# Patient Record
Sex: FEMALE | Race: WHITE | NOT HISPANIC OR LATINO | Employment: FULL TIME | URBAN - METROPOLITAN AREA
[De-identification: names, ages, dates, MRNs, and addresses within clinical notes are randomized per-mention and may not be internally consistent; named-entity substitution may affect disease eponyms.]

---

## 2023-05-20 DIAGNOSIS — K21.9 GASTROESOPHAGEAL REFLUX DISEASE WITHOUT ESOPHAGITIS: Primary | ICD-10-CM

## 2023-05-22 PROBLEM — R74.8 ELEVATED LIVER ENZYMES: Status: ACTIVE | Noted: 2023-05-22

## 2023-05-22 PROBLEM — I10 BENIGN HYPERTENSION: Status: ACTIVE | Noted: 2023-05-22

## 2023-05-22 PROBLEM — F32.A ANXIETY AND DEPRESSION: Status: ACTIVE | Noted: 2023-05-22

## 2023-05-22 PROBLEM — E78.2 COMBINED HYPERLIPIDEMIA: Status: ACTIVE | Noted: 2023-05-22

## 2023-05-22 PROBLEM — R14.0 BLOATING: Status: RESOLVED | Noted: 2023-05-22 | Resolved: 2023-05-22

## 2023-05-22 PROBLEM — F07.81 POST CONCUSSIVE SYNDROME: Status: ACTIVE | Noted: 2023-05-22

## 2023-05-22 PROBLEM — R41.3 MEMORY LOSS: Status: ACTIVE | Noted: 2023-05-22

## 2023-05-22 PROBLEM — G47.9 SLEEP DISTURBANCE: Status: ACTIVE | Noted: 2023-05-22

## 2023-05-22 PROBLEM — F41.9 ANXIETY AND DEPRESSION: Status: ACTIVE | Noted: 2023-05-22

## 2023-05-22 PROBLEM — K58.9 IBS (IRRITABLE BOWEL SYNDROME): Status: ACTIVE | Noted: 2023-05-22

## 2023-05-22 PROBLEM — D06.9 CIN III (CERVICAL INTRAEPITHELIAL NEOPLASIA GRADE III) WITH SEVERE DYSPLASIA: Status: ACTIVE | Noted: 2023-05-22

## 2023-05-22 PROBLEM — N84.1 CERVICAL POLYP: Status: ACTIVE | Noted: 2023-05-22

## 2023-05-22 PROBLEM — L71.9 ROSACEA: Status: ACTIVE | Noted: 2023-05-22

## 2023-05-22 PROBLEM — E66.01 MORBID OBESITY (MULTI): Status: ACTIVE | Noted: 2023-05-22

## 2023-05-22 PROBLEM — S06.0XAA CLOSED HEAD INJURY WITH CONCUSSION: Status: ACTIVE | Noted: 2023-05-22

## 2023-05-22 PROBLEM — R05.9 COUGH: Status: RESOLVED | Noted: 2023-05-22 | Resolved: 2023-05-22

## 2023-05-22 PROBLEM — K21.9 GERD (GASTROESOPHAGEAL REFLUX DISEASE): Status: ACTIVE | Noted: 2023-05-22

## 2023-05-22 RX ORDER — TRIAMTERENE AND HYDROCHLOROTHIAZIDE 37.5; 25 MG/1; MG/1
1 CAPSULE ORAL DAILY
COMMUNITY
End: 2023-06-12

## 2023-05-22 RX ORDER — DESVENLAFAXINE SUCCINATE 25 MG/1
25 TABLET, EXTENDED RELEASE ORAL
COMMUNITY
End: 2023-07-13 | Stop reason: SDUPTHER

## 2023-05-22 RX ORDER — OMEPRAZOLE 40 MG/1
CAPSULE, DELAYED RELEASE ORAL
Qty: 180 CAPSULE | Refills: 0 | Status: SHIPPED | OUTPATIENT
Start: 2023-05-22 | End: 2023-11-22 | Stop reason: SDUPTHER

## 2023-05-22 RX ORDER — OMEPRAZOLE 40 MG/1
CAPSULE, DELAYED RELEASE ORAL
COMMUNITY
Start: 2020-04-08 | End: 2023-08-21 | Stop reason: SDUPTHER

## 2023-06-09 DIAGNOSIS — I10 BENIGN HYPERTENSION: Primary | ICD-10-CM

## 2023-06-12 RX ORDER — TRIAMTERENE AND HYDROCHLOROTHIAZIDE 37.5; 25 MG/1; MG/1
1 CAPSULE ORAL DAILY
Qty: 30 CAPSULE | Refills: 0 | Status: SHIPPED | OUTPATIENT
Start: 2023-06-12 | End: 2023-07-18 | Stop reason: SDUPTHER

## 2023-07-05 ENCOUNTER — APPOINTMENT (OUTPATIENT)
Dept: PRIMARY CARE | Facility: CLINIC | Age: 53
End: 2023-07-05
Payer: COMMERCIAL

## 2023-07-13 ENCOUNTER — APPOINTMENT (OUTPATIENT)
Dept: PRIMARY CARE | Facility: CLINIC | Age: 53
End: 2023-07-13
Payer: COMMERCIAL

## 2023-07-13 ENCOUNTER — TELEPHONE (OUTPATIENT)
Dept: PRIMARY CARE | Facility: CLINIC | Age: 53
End: 2023-07-13

## 2023-07-13 DIAGNOSIS — F32.A ANXIETY AND DEPRESSION: ICD-10-CM

## 2023-07-13 DIAGNOSIS — F41.9 ANXIETY AND DEPRESSION: ICD-10-CM

## 2023-07-13 RX ORDER — DESVENLAFAXINE SUCCINATE 25 MG/1
25 TABLET, EXTENDED RELEASE ORAL
Qty: 90 TABLET | Refills: 0 | Status: SHIPPED | OUTPATIENT
Start: 2023-07-13 | End: 2023-08-21 | Stop reason: SDUPTHER

## 2023-07-13 NOTE — TELEPHONE ENCOUNTER
**PT HAS 2 LEFT**    Rx Refill Request Telephone Encounter    Name:  Stephanie Lira  :  [id number]  Medication Name:  DESVENLAFAXINE 25MG Q/D TO COVER UNTIL   Specific Pharmacy location:  /  Date of last appointment:  2022  Date of next appointment:  2023  Best number to reach patient:  [phone]

## 2023-07-18 DIAGNOSIS — I10 BENIGN HYPERTENSION: ICD-10-CM

## 2023-07-18 RX ORDER — TRIAMTERENE AND HYDROCHLOROTHIAZIDE 37.5; 25 MG/1; MG/1
1 CAPSULE ORAL DAILY
Qty: 30 CAPSULE | Refills: 0 | Status: SHIPPED | OUTPATIENT
Start: 2023-07-18 | End: 2023-08-21 | Stop reason: SDUPTHER

## 2023-08-04 ENCOUNTER — APPOINTMENT (OUTPATIENT)
Dept: PRIMARY CARE | Facility: CLINIC | Age: 53
End: 2023-08-04
Payer: COMMERCIAL

## 2023-08-08 ENCOUNTER — APPOINTMENT (OUTPATIENT)
Dept: PRIMARY CARE | Facility: CLINIC | Age: 53
End: 2023-08-08
Payer: COMMERCIAL

## 2023-08-14 ENCOUNTER — APPOINTMENT (OUTPATIENT)
Dept: PRIMARY CARE | Facility: CLINIC | Age: 53
End: 2023-08-14
Payer: COMMERCIAL

## 2023-08-21 ENCOUNTER — OFFICE VISIT (OUTPATIENT)
Dept: PRIMARY CARE | Facility: CLINIC | Age: 53
End: 2023-08-21
Payer: COMMERCIAL

## 2023-08-21 VITALS
SYSTOLIC BLOOD PRESSURE: 146 MMHG | HEIGHT: 65 IN | DIASTOLIC BLOOD PRESSURE: 99 MMHG | WEIGHT: 276 LBS | OXYGEN SATURATION: 94 % | HEART RATE: 81 BPM | BODY MASS INDEX: 45.98 KG/M2

## 2023-08-21 DIAGNOSIS — Z12.31 VISIT FOR SCREENING MAMMOGRAM: ICD-10-CM

## 2023-08-21 DIAGNOSIS — J39.2 EDEMA OF THROAT: ICD-10-CM

## 2023-08-21 DIAGNOSIS — I10 BENIGN HYPERTENSION: ICD-10-CM

## 2023-08-21 DIAGNOSIS — F41.9 ANXIETY AND DEPRESSION: ICD-10-CM

## 2023-08-21 DIAGNOSIS — E78.2 COMBINED HYPERLIPIDEMIA: Primary | ICD-10-CM

## 2023-08-21 DIAGNOSIS — F32.A ANXIETY AND DEPRESSION: ICD-10-CM

## 2023-08-21 DIAGNOSIS — R19.7 DIARRHEA, UNSPECIFIED TYPE: ICD-10-CM

## 2023-08-21 DIAGNOSIS — K21.9 GASTROESOPHAGEAL REFLUX DISEASE WITHOUT ESOPHAGITIS: ICD-10-CM

## 2023-08-21 PROCEDURE — 1036F TOBACCO NON-USER: CPT | Performed by: FAMILY MEDICINE

## 2023-08-21 PROCEDURE — 3077F SYST BP >= 140 MM HG: CPT | Performed by: FAMILY MEDICINE

## 2023-08-21 PROCEDURE — 3080F DIAST BP >= 90 MM HG: CPT | Performed by: FAMILY MEDICINE

## 2023-08-21 PROCEDURE — 99214 OFFICE O/P EST MOD 30 MIN: CPT | Performed by: FAMILY MEDICINE

## 2023-08-21 RX ORDER — TRIAMTERENE AND HYDROCHLOROTHIAZIDE 37.5; 25 MG/1; MG/1
1 CAPSULE ORAL DAILY
Qty: 90 CAPSULE | Refills: 1 | Status: SHIPPED | OUTPATIENT
Start: 2023-08-21 | End: 2024-02-16

## 2023-08-21 RX ORDER — DESVENLAFAXINE SUCCINATE 25 MG/1
25 TABLET, EXTENDED RELEASE ORAL
Qty: 90 TABLET | Refills: 3 | Status: SHIPPED | OUTPATIENT
Start: 2023-08-21

## 2023-08-21 ASSESSMENT — ENCOUNTER SYMPTOMS
DIARRHEA: 1
CONSTIPATION: 0

## 2023-08-21 NOTE — PATIENT INSTRUCTIONS
Get your blood work as ordered.  You should hear from our office with results whether they are normal are not within a few days.  Please call the office if you do not hear from us.     Hypertension Plan:  You should check your blood pressures 2-3 times per month.  Your goal should be systolic (upper number ) < 140, and diastolic (bottom number) < 90.  Please periodically inform office of your BP numbers.  You should follow a low salt diet and exercise routinely.  It is important that you keep your weight under control.  With hypertension you should be seen in the office at least twice per year.     You should be getting cardiovascular exercise 3-5 times per week for 30-45 minutes.  This includes exercises such as running, brisk walking, biking or swimming.       It is important to actively try to reduce your weight to become healthier.  There are several things you can do to try and lose weight.  You should be getting 30-45 minutes of cardiovascular exercise 3-5 times per week, activities such as running and jogging treadmill swimming and brisk walking are helpful.  You will also need to watch your portion control, decrease calorie intake overall.  Following a low carbohydrate diet is the most successful way to lose weight and take it off long-term.  In order to achieve weight loss it is important that you track exactly what your calorie intake is during the day.  I usually recommend doing some sort of formal program or Matheus. there are lot of good Apps out there to help you follow your calorie intake such as weight watchers, Noom, Fitbit.  It is recommended that you reduce the intake of sweets, sugar, reduce carbohydrate intake.  Healthy diets with more whole grains, vegetables, fruits, nuts and seeds with plant oils are healthier diets than animal-based fats.  Reduce your intake of white bread, grains, potatoes, processed foods.       Referral to GI

## 2023-08-21 NOTE — PROGRESS NOTES
"Subjective   Patient ID: Stephanie Lira is a 52 y.o. female who presents for medication review. States she saw Lilibeth Desai last year due to food sensitivities; was advised to follow a low fod map diet. Pt has some questions regarding her sx's. Pt was not a fan of the CNP she saw; requesting to see the GI doctor instead. Second concern pt has is red spots on her abdomen.     Hypertension  : Patient is taking blood pressure medications as directed.  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 Negative for constipation.       Objective   BP (!) 146/99   Pulse 81   Ht 1.651 m (5' 5\")   Wt 125 kg (276 lb)   SpO2 94%   BMI 45.93 kg/m²     Physical Exam  Constitutional:       Appearance: Normal appearance. She is well-developed.   Cardiovascular:      Rate and Rhythm: Normal rate and regular rhythm.      Heart sounds: Normal heart sounds. No murmur heard.  Pulmonary:      Effort: Pulmonary effort is normal.      Breath sounds: Normal breath sounds.   Neurological:      General: No focal deficit present.      Mental Status: She is alert.         Assessment/Plan   Problem List Items Addressed This Visit       Anxiety and depression    Relevant Medications    desvenlafaxine succinate (Pristiq) 25 mg 24 hour tablet    Other Relevant Orders    Food Allergy Profile IgE    Respiratory Allergy Profile IgE    CBC    " Comprehensive Metabolic Panel    Thyroid Stimulating Hormone    Referral to Gastroenterology    Vitamin D, Total    Vitamin B12    Benign hypertension    Relevant Medications    desvenlafaxine succinate (Pristiq) 25 mg 24 hour tablet    triamterene-hydrochlorothiazid (Dyazide) 37.5-25 mg capsule    Other Relevant Orders    Food Allergy Profile IgE    Respiratory Allergy Profile IgE    CBC    Comprehensive Metabolic Panel    Thyroid Stimulating Hormone    Referral to Gastroenterology    Vitamin D, Total    Vitamin B12    Combined hyperlipidemia - Primary    Relevant Medications    desvenlafaxine succinate (Pristiq) 25 mg 24 hour tablet    Other Relevant Orders    Food Allergy Profile IgE    Respiratory Allergy Profile IgE    CBC    Comprehensive Metabolic Panel    Thyroid Stimulating Hormone    Referral to Gastroenterology    Vitamin D, Total    Vitamin B12    GERD (gastroesophageal reflux disease)    Relevant Orders    Food Allergy Profile IgE    Respiratory Allergy Profile IgE    CBC    Comprehensive Metabolic Panel    Thyroid Stimulating Hormone    Referral to Gastroenterology    Vitamin D, Total    Vitamin B12     Other Visit Diagnoses       Diarrhea, unspecified type        Relevant Orders    Food Allergy Profile IgE    Respiratory Allergy Profile IgE    CBC    Comprehensive Metabolic Panel    Thyroid Stimulating Hormone    Referral to Gastroenterology    Vitamin D, Total    Vitamin B12    Edema of throat        Relevant Orders    Food Allergy Profile IgE    Respiratory Allergy Profile IgE    CBC    Comprehensive Metabolic Panel    Thyroid Stimulating Hormone    Referral to Gastroenterology    Vitamin D, Total    Vitamin B12    Visit for screening mammogram        Relevant Orders    BI mammo bilateral screening tomosynthesis

## 2023-08-23 ENCOUNTER — LAB (OUTPATIENT)
Dept: LAB | Facility: LAB | Age: 53
End: 2023-08-23
Payer: COMMERCIAL

## 2023-08-23 DIAGNOSIS — E78.2 COMBINED HYPERLIPIDEMIA: ICD-10-CM

## 2023-08-23 DIAGNOSIS — J39.2 EDEMA OF THROAT: ICD-10-CM

## 2023-08-23 DIAGNOSIS — R19.7 DIARRHEA, UNSPECIFIED TYPE: ICD-10-CM

## 2023-08-23 DIAGNOSIS — K21.9 GASTROESOPHAGEAL REFLUX DISEASE WITHOUT ESOPHAGITIS: ICD-10-CM

## 2023-08-23 DIAGNOSIS — F32.A ANXIETY AND DEPRESSION: ICD-10-CM

## 2023-08-23 DIAGNOSIS — F41.9 ANXIETY AND DEPRESSION: ICD-10-CM

## 2023-08-23 DIAGNOSIS — I10 BENIGN HYPERTENSION: ICD-10-CM

## 2023-08-23 LAB
ALANINE AMINOTRANSFERASE (SGPT) (U/L) IN SER/PLAS: 41 U/L (ref 7–45)
ALBUMIN (G/DL) IN SER/PLAS: 4.1 G/DL (ref 3.4–5)
ALKALINE PHOSPHATASE (U/L) IN SER/PLAS: 90 U/L (ref 33–110)
ANION GAP IN SER/PLAS: 13 MMOL/L (ref 10–20)
ASPARTATE AMINOTRANSFERASE (SGOT) (U/L) IN SER/PLAS: 30 U/L (ref 9–39)
BILIRUBIN TOTAL (MG/DL) IN SER/PLAS: 0.4 MG/DL (ref 0–1.2)
CALCIUM (MG/DL) IN SER/PLAS: 9.4 MG/DL (ref 8.6–10.3)
CARBON DIOXIDE, TOTAL (MMOL/L) IN SER/PLAS: 28 MMOL/L (ref 21–32)
CHLORIDE (MMOL/L) IN SER/PLAS: 102 MMOL/L (ref 98–107)
CREATININE (MG/DL) IN SER/PLAS: 0.7 MG/DL (ref 0.5–1.05)
ERYTHROCYTE DISTRIBUTION WIDTH (RATIO) BY AUTOMATED COUNT: 13.2 % (ref 11.5–14.5)
ERYTHROCYTE MEAN CORPUSCULAR HEMOGLOBIN CONCENTRATION (G/DL) BY AUTOMATED: 31.1 G/DL (ref 32–36)
ERYTHROCYTE MEAN CORPUSCULAR VOLUME (FL) BY AUTOMATED COUNT: 88 FL (ref 80–100)
ERYTHROCYTES (10*6/UL) IN BLOOD BY AUTOMATED COUNT: 5.14 X10E12/L (ref 4–5.2)
GFR FEMALE: >90 ML/MIN/1.73M2
GLUCOSE (MG/DL) IN SER/PLAS: 90 MG/DL (ref 74–99)
HEMATOCRIT (%) IN BLOOD BY AUTOMATED COUNT: 45.3 % (ref 36–46)
HEMOGLOBIN (G/DL) IN BLOOD: 14.1 G/DL (ref 12–16)
LEUKOCYTES (10*3/UL) IN BLOOD BY AUTOMATED COUNT: 5.2 X10E9/L (ref 4.4–11.3)
PLATELETS (10*3/UL) IN BLOOD AUTOMATED COUNT: 282 X10E9/L (ref 150–450)
POTASSIUM (MMOL/L) IN SER/PLAS: 4.7 MMOL/L (ref 3.5–5.3)
PROTEIN TOTAL: 6.8 G/DL (ref 6.4–8.2)
SODIUM (MMOL/L) IN SER/PLAS: 138 MMOL/L (ref 136–145)
THYROTROPIN (MIU/L) IN SER/PLAS BY DETECTION LIMIT <= 0.05 MIU/L: 3.01 MIU/L (ref 0.44–3.98)
UREA NITROGEN (MG/DL) IN SER/PLAS: 12 MG/DL (ref 6–23)

## 2023-08-23 PROCEDURE — 86003 ALLG SPEC IGE CRUDE XTRC EA: CPT

## 2023-08-23 PROCEDURE — 82306 VITAMIN D 25 HYDROXY: CPT

## 2023-08-23 PROCEDURE — 82607 VITAMIN B-12: CPT

## 2023-08-23 PROCEDURE — 84443 ASSAY THYROID STIM HORMONE: CPT

## 2023-08-23 PROCEDURE — 85027 COMPLETE CBC AUTOMATED: CPT

## 2023-08-23 PROCEDURE — 36415 COLL VENOUS BLD VENIPUNCTURE: CPT

## 2023-08-23 PROCEDURE — 80053 COMPREHEN METABOLIC PANEL: CPT

## 2023-08-23 PROCEDURE — 82785 ASSAY OF IGE: CPT

## 2023-08-24 LAB
CALCIDIOL (25 OH VITAMIN D3) (NG/ML) IN SER/PLAS: 56 NG/ML
COBALAMIN (VITAMIN B12) (PG/ML) IN SER/PLAS: 267 PG/ML (ref 211–911)

## 2023-08-25 LAB
ALLERGEN ANIMAL: CAT DANDER IGE (KU/L): <0.1 KU/L
ALLERGEN ANIMAL: DOG DANDER IGE (KU/L): <0.1 KU/L
ALLERGEN FOOD: CLAM (RUDITAPES SPP.) IGE (KU/L): <0.1 KU/L
ALLERGEN FOOD: EGG WHITE IGE (KU/L): 0.1 KU/L
ALLERGEN FOOD: FISH (COD) GADUS MORHUA) IGE (KU/L): <0.1 KU/L
ALLERGEN FOOD: MAIZE, CORN (ZEA MAYS) IGE (KU/L): <0.1 KU/L
ALLERGEN FOOD: MILK IGE (KU/L): <0.1 KU/L
ALLERGEN FOOD: PEANUT (ARACHIS HYPOGAEA) IGE (KU/L): <0.1 KU/L
ALLERGEN FOOD: SCALLOP (PECTEN SPP.) IGE (KU/L): <0.1 KU/L
ALLERGEN FOOD: SESAME SEED (SESAMUM INDICUM) IGE (KU/L): <0.1 KU/L
ALLERGEN FOOD: SHRIMP (P. BOREALIS/MONODON, M. BARBATA/JOYNERI) IGE (KU/L): <0.1 KU/L
ALLERGEN FOOD: SOYBEAN (GLYCINE MAX) IGE (KU/L): <0.1 KU/L
ALLERGEN FOOD: WALNUT (JUGLANS SPP.) IGE (KU/L): <0.1 KU/L
ALLERGEN FOOD: WHEAT (TRITICUM AESTIVUM) IGE (KU/L): <0.1 KU/L
ALLERGEN GRASS: BERMUDA GRASS (CYNODON DACTYLON) IGE (KU/L): <0.1 KU/L
ALLERGEN GRASS: JOHNSON GRASS (SORGHUM HALEPENSE) IGE (KU/L): <0.1 KU/L
ALLERGEN GRASS: MEADOW GRASS, KENTUCKY BLUE (POA PRATENSIS )IGE (KU/L): <0.1 KU/L
ALLERGEN GRASS: TIMOTHY GRASS (PHLEUM PRATENSE) IGE (KU/L): <0.1 KU/L
ALLERGEN INSECT: COCKROACH IGE: <0.1 KU/L
ALLERGEN MICROORGANISM: ALTERNARIA ALTERNATA IGE (KU/L): <0.1 KU/L
ALLERGEN MICROORGANISM: ASPERGILLUS FUMIGATUS IGE (KU/L): <0.1 KU/L
ALLERGEN MICROORGANISM: CLADOSPORIUM HERBARUM IGE (KU/L): <0.1 KU/L
ALLERGEN MICROORGANISM: PENICILLIUM CHRYSOGENUM (P. NOTATUM) IGE (KU/L): <0.1 KU/L
ALLERGEN MITE: DERMATOPHAGOIDES FARINAE (HOUSE DUST MITE) IGE (KU/L): <0.1 KU/L
ALLERGEN MITE: DERMATOPHAGOIDES PTERONYSSINUS (HOUSE DUST MITE) IGE (KU/L): <0.1 KU/L
ALLERGEN TREE: BOX-ELDER (ACER NEGUNDO) IGE (KU/L): <0.1 KU/L
ALLERGEN TREE: COMMON SILVER BIRCH (BETULA VERRUCOSA) IGE (KU/L): <0.1 KU/L
ALLERGEN TREE: COTTONWOOD (POPULUS DELTOIDES) IGE (KU/L): <0.1 KU/L
ALLERGEN TREE: ELM (ULMUS AMERICANA) IGE (KU/L): <0.1 KU/L
ALLERGEN TREE: MAPLE LEAF SYCAMORE, LONDON PLANE IGE (KU/L): <0.1 KU/L
ALLERGEN TREE: MOUNTAIN JUNIPER (JUNIPERUS SABINOIDES) IGE (KU/L): <0.1 KU/L
ALLERGEN TREE: MULBERRY (MORUS ALBA) IGE (KU/L): <0.1 KU/L
ALLERGEN TREE: OAK (QUERCUS ALBA) IGE (KU/L): <0.1 KU/L
ALLERGEN TREE: PECAN, HICKORY (CARYA PECAN) IGE (KU/L): <0.1 KU/L
ALLERGEN TREE: WALNUT IGE: <0.1 KU/L
ALLERGEN TREE: WHITE ASH (FRAXINUS AMERICANA) IGE (KU/L): <0.1 KU/L
ALLERGEN WEED: COMMON PIGWEED (AMARANTHUS RETROFLEXUS) IGE (KU/L): <0.1 KU/L
ALLERGEN WEED: COMMON RAGWEED (AMB. ARTEMISIIFOLIA/A. ELATIOR) IGE (KU/L): <0.1 KU/L
ALLERGEN WEED: GOOSEFOOT, LAMB'S QUARTERS (CHENOPODIUM ALBUM) IGE (KU/L): <0.1 KU/L
ALLERGEN WEED: PLANTAIN (ENGLISH), RIBWORT (PLANTAGO LANCEOLATA) IGE (KU/L): <0.1 KU/L
ALLERGEN WEED: PRICKLY SALTWORT/RUSSIAN THISTLE (SALSOLA KALI) IGE (KU/L): <0.1 KU/L
ALLERGEN WEED: SHEEP SORREL (RUMEX ACETOSELLA) IGE (KU/L): <0.1 KU/L
IMMUNOCAP IGE: 27.7 KU/L (ref 0–214)
IMMUNOCAP INTERPRETATION: NORMAL
IMMUNOCAP INTERPRETATION: NORMAL

## 2023-10-10 ENCOUNTER — OFFICE VISIT (OUTPATIENT)
Dept: GASTROENTEROLOGY | Facility: CLINIC | Age: 53
End: 2023-10-10
Payer: COMMERCIAL

## 2023-10-10 VITALS — BODY MASS INDEX: 45.32 KG/M2 | WEIGHT: 272 LBS | HEART RATE: 79 BPM | HEIGHT: 65 IN

## 2023-10-10 DIAGNOSIS — R19.7 DIARRHEA, UNSPECIFIED TYPE: ICD-10-CM

## 2023-10-10 DIAGNOSIS — K58.0 IRRITABLE BOWEL SYNDROME WITH DIARRHEA: Primary | ICD-10-CM

## 2023-10-10 DIAGNOSIS — R14.0 ABDOMINAL BLOATING: ICD-10-CM

## 2023-10-10 PROCEDURE — 1036F TOBACCO NON-USER: CPT | Performed by: INTERNAL MEDICINE

## 2023-10-10 PROCEDURE — 99204 OFFICE O/P NEW MOD 45 MIN: CPT | Performed by: INTERNAL MEDICINE

## 2023-10-10 RX ORDER — DICYCLOMINE HYDROCHLORIDE 20 MG/1
20 TABLET ORAL 4 TIMES DAILY PRN
Qty: 120 TABLET | Refills: 1 | Status: SHIPPED | OUTPATIENT
Start: 2023-10-10 | End: 2023-11-09

## 2023-10-10 ASSESSMENT — ENCOUNTER SYMPTOMS
ABDOMINAL DISTENTION: 1
NAUSEA: 1
DIARRHEA: 1
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
## 2023-10-10 NOTE — PATIENT INSTRUCTIONS
Call [phone] to schedule the hydrogen breath test to evaluate for small intestinal bacterial overgrowth.    Use Dicyclomine 20 mg taken up to 4 times per day as needed for abdominal pain.  Do not take this medication immediately prior to driving as it may cause drowsiness.    Use Benefiber 2 teaspoonfuls mixed with 8 ounces of juice or water once daily.    Follow-up in the office in 1 month.

## 2023-11-07 ENCOUNTER — PROCEDURE VISIT (OUTPATIENT)
Dept: GASTROENTEROLOGY | Facility: CLINIC | Age: 53
End: 2023-11-07
Payer: COMMERCIAL

## 2023-11-07 DIAGNOSIS — K58.0 IRRITABLE BOWEL SYNDROME WITH DIARRHEA: ICD-10-CM

## 2023-11-07 DIAGNOSIS — R14.0 ABDOMINAL BLOATING: ICD-10-CM

## 2023-11-07 DIAGNOSIS — R19.7 DIARRHEA, UNSPECIFIED TYPE: ICD-10-CM

## 2023-11-07 PROCEDURE — 91065 BREATH HYDROGEN/METHANE TEST: CPT | Performed by: NURSE PRACTITIONER

## 2023-11-07 NOTE — PROGRESS NOTES
Patient ID: Stephanie Lira is a 53 y.o. female.    Breath Hydrogen Test-Dextrose    Date/Time: 11/7/2023 10:33 AM    Performed by: Jesika Slaughter RN  Authorized by: Don Martinez MD    Consent:     Consent obtained:  Verbal    Consent given by:  Patient  Universal protocol:     Procedure explained and questions answered to patient or proxy's satisfaction: yes      Patient identity confirmed:  Verbally with patient  Sedation:     Sedation type:  None  Anesthesia:     Anesthesia method:  None  Post-procedure details:     Procedure completion:  Tolerated  Hydrogen Breath Analysis Consultation Sheet    Referring Provider: Don Martinez Md  [address] [facility]  [place] [id number]    Indication: R/O SIBO    Symptoms: Irritable bowel syndrome with diarrhea           Abdominal bloating                     Diarrhea    Age: 53 y.o.  Weight: There were no vitals filed for this visit.  Substrate: Glucose   Dose: 75 gms    Last Meal: 11/6 18:00  Recent Antibiotics: No    RESULTS:   Time PPM (H2) APPM* (CH4) CO2 Correction   Baseline #1 8:23 2 3 5.8 0.95   Baseline #2 8:25 1 3 5.2 1.05   *Challenge Dose Sugar: 8:30  15' 8:45 2 3 5.8 0.95   30' 9:00 3 4 5.6 0.99   45' 9:15 2 3 5.6 0.99   60' 9:30 2 3 5.3 1.03   75' 9:45 2 4 4.6 1.19   90' 10:00 2 3 5.3 1.03   105' 10:15 2 2 5.1 107   120'        135'        150'        165'        180'          Impression: negative for SIBO and methane

## 2023-11-10 ENCOUNTER — OFFICE VISIT (OUTPATIENT)
Dept: GASTROENTEROLOGY | Facility: CLINIC | Age: 53
End: 2023-11-10
Payer: COMMERCIAL

## 2023-11-10 VITALS — BODY MASS INDEX: 45.32 KG/M2 | WEIGHT: 272 LBS | HEIGHT: 65 IN | HEART RATE: 78 BPM

## 2023-11-10 DIAGNOSIS — K58.0 IRRITABLE BOWEL SYNDROME WITH DIARRHEA: Primary | ICD-10-CM

## 2023-11-10 DIAGNOSIS — R14.0 BLOATING: ICD-10-CM

## 2023-11-10 DIAGNOSIS — R19.4 CHANGE IN BOWEL HABITS: ICD-10-CM

## 2023-11-10 PROCEDURE — 99214 OFFICE O/P EST MOD 30 MIN: CPT | Performed by: INTERNAL MEDICINE

## 2023-11-10 PROCEDURE — 3080F DIAST BP >= 90 MM HG: CPT | Performed by: INTERNAL MEDICINE

## 2023-11-10 PROCEDURE — 3077F SYST BP >= 140 MM HG: CPT | Performed by: INTERNAL MEDICINE

## 2023-11-10 PROCEDURE — 1036F TOBACCO NON-USER: CPT | Performed by: INTERNAL MEDICINE

## 2023-11-10 ASSESSMENT — ENCOUNTER SYMPTOMS: SHORTNESS OF BREATH: 0

## 2023-11-10 NOTE — PATIENT INSTRUCTIONS
Start Benefiber powder 2 teaspoonfuls mixed with 8 ounces of juice or water once daily.    Prescribed course of Rifaximin 550 mg taken three times per day for 14 days.    If no improvement with the above, then consider a trial of low-dose Amitriptyline in the future if ok from Neurology/TBI standpoint.    Follow-up in the office in 6 months.
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
## 2023-11-13 DIAGNOSIS — K58.0 IRRITABLE BOWEL SYNDROME WITH DIARRHEA: ICD-10-CM

## 2023-11-20 ENCOUNTER — APPOINTMENT (OUTPATIENT)
Dept: GASTROENTEROLOGY | Facility: CLINIC | Age: 53
End: 2023-11-20
Payer: COMMERCIAL

## 2023-11-22 DIAGNOSIS — K21.9 GASTROESOPHAGEAL REFLUX DISEASE WITHOUT ESOPHAGITIS: ICD-10-CM

## 2023-11-22 RX ORDER — OMEPRAZOLE 40 MG/1
CAPSULE, DELAYED RELEASE ORAL
Qty: 180 CAPSULE | Refills: 0 | Status: SHIPPED | OUTPATIENT
Start: 2023-11-22 | End: 2024-04-29

## 2024-01-26 ENCOUNTER — APPOINTMENT (OUTPATIENT)
Dept: NEUROLOGY | Facility: CLINIC | Age: 54
End: 2024-01-26
Payer: COMMERCIAL

## 2024-02-13 ENCOUNTER — APPOINTMENT (OUTPATIENT)
Dept: NEUROLOGY | Facility: CLINIC | Age: 54
End: 2024-02-13
Payer: COMMERCIAL

## 2024-02-15 ENCOUNTER — OFFICE VISIT (OUTPATIENT)
Dept: OBSTETRICS AND GYNECOLOGY | Facility: CLINIC | Age: 54
End: 2024-02-15
Payer: COMMERCIAL

## 2024-02-15 VITALS — DIASTOLIC BLOOD PRESSURE: 92 MMHG | WEIGHT: 281 LBS | SYSTOLIC BLOOD PRESSURE: 140 MMHG | BODY MASS INDEX: 46.76 KG/M2

## 2024-02-15 DIAGNOSIS — R10.2 PELVIC PAIN: ICD-10-CM

## 2024-02-15 DIAGNOSIS — Z01.419 WELL WOMAN EXAM: Primary | ICD-10-CM

## 2024-02-15 DIAGNOSIS — Z12.31 VISIT FOR SCREENING MAMMOGRAM: ICD-10-CM

## 2024-02-15 DIAGNOSIS — I10 BENIGN HYPERTENSION: ICD-10-CM

## 2024-02-15 DIAGNOSIS — Z12.4 CERVICAL CANCER SCREENING: ICD-10-CM
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
## 2024-02-15 PROBLEM — J90 PLEURAL EFFUSION: Status: RESOLVED | Noted: 2024-02-15 | Resolved: 2024-02-15

## 2024-02-15 PROBLEM — K58.0 IRRITABLE BOWEL SYNDROME WITH DIARRHEA: Status: ACTIVE | Noted: 2023-05-22

## 2024-02-15 PROBLEM — O16.9 HYPERTENSION AFFECTING PREGNANCY (HHS-HCC): Status: RESOLVED | Noted: 2024-02-15 | Resolved: 2024-02-15

## 2024-02-15 PROBLEM — Z85.9 HISTORY OF MALIGNANT NEOPLASM: Status: RESOLVED | Noted: 2024-02-15 | Resolved: 2024-02-15

## 2024-02-15 PROBLEM — F41.8 MIXED ANXIETY DEPRESSIVE DISORDER: Status: ACTIVE | Noted: 2023-05-22

## 2024-02-15 PROBLEM — S06.5XAA SUBDURAL HEMATOMA (MULTI): Status: RESOLVED | Noted: 2024-02-15 | Resolved: 2024-02-15

## 2024-02-15 PROBLEM — R19.7 DIARRHEA: Status: RESOLVED | Noted: 2023-08-23 | Resolved: 2024-02-15

## 2024-02-15 PROCEDURE — 88175 CYTOPATH C/V AUTO FLUID REDO: CPT

## 2024-02-15 PROCEDURE — 1036F TOBACCO NON-USER: CPT | Performed by: OBSTETRICS & GYNECOLOGY

## 2024-02-15 PROCEDURE — 87624 HPV HI-RISK TYP POOLED RSLT: CPT

## 2024-02-15 PROCEDURE — 3080F DIAST BP >= 90 MM HG: CPT | Performed by: OBSTETRICS & GYNECOLOGY

## 2024-02-15 PROCEDURE — 99386 PREV VISIT NEW AGE 40-64: CPT | Performed by: OBSTETRICS & GYNECOLOGY

## 2024-02-15 PROCEDURE — 3077F SYST BP >= 140 MM HG: CPT | Performed by: OBSTETRICS & GYNECOLOGY

## 2024-02-15 ASSESSMENT — ENCOUNTER SYMPTOMS
RESPIRATORY NEGATIVE: 1
CARDIOVASCULAR NEGATIVE: 1
NEUROLOGICAL NEGATIVE: 1
GASTROINTESTINAL NEGATIVE: 1
CONSTITUTIONAL NEGATIVE: 1
MUSCULOSKELETAL NEGATIVE: 1
PSYCHIATRIC NEGATIVE: 1

## 2024-02-15 NOTE — PATIENT INSTRUCTIONS
Routine Gynecologic Visit:  You were seen today for a routine gyn visit with normal findings on exam  You were due for a pap smear today. You should still continue to get annual breast and pelvic exams in the office.  An order was placed in the system for mammogram. Please get done at your earliest convenience  An order for a pelvic ultrasound is in the system, please schedule at [facility]. You will receive results by phone/MyChart  If you are having any gynecologic issues in the next year you should call the office. [phone] ([place]) or [phone] ([place])

## 2024-02-15 NOTE — PROGRESS NOTES
Subjective   Stephanie Lira is a 53 y.o. female who presents for annual exam. The patient has no complaints today. The patient is sexually active. GYN screening history: last pap: was normal. The patient is not taking hormone replacement therapy. Patient denies post-menopausal vaginal bleeding.. The patient wears seatbelts: yes. The patient participates in regular exercise: yes. Has the patient ever been transfused or tattooed?: no. The patient reports that there is not domestic violence in their life.     Menstrual History:  OB History          5    Para   3    Term   3            AB   2    Living             SAB   1    IAB   1    Ectopic        Multiple        Live Births   3                  No LMP recorded. Patient is postmenopausal.         Review of Systems   Constitutional: Negative.    Respiratory: Negative.     Cardiovascular: Negative.    Gastrointestinal: Negative.    Genitourinary: Negative.    Musculoskeletal: Negative.    Neurological: Negative.    Psychiatric/Behavioral: Negative.          Objective   BP (!) 140/92   Wt 127 kg (281 lb)   BMI 46.76 kg/m²     General:   alert and oriented, in no acute distress   Heart: regular rate and rhythm, S1, S2 normal, no murmur, click, rub or gallop   Lungs: clear to auscultation bilaterally   Abdomen: soft, non-tender, without masses or organomegaly   Pelvic: Vulva normal in appearance without discoloration, rashes, lesions. Vagina is negative for blood, discharge, lesions. Uterus is small, mobile, non tender. There is no cervical motion tenderness, adnexal masses/tenderness.    Breast: No masses, skin changes, discoloration, tenderness, or nipple drainage bilaterally     Neck: Normal ROM. Thyroid normal size. No masses/tenderness     Lymph: No LAD   Psych: Normal mood/affect     Lab Review      Assessment/Plan   Problem List Items Addressed This Visit    None  Visit Diagnoses         Codes    Well woman exam    -  Primary Z01.419    Pelvic and  breast exam normal  Precautions given  RTO 1 year RA     Cervical cancer screening     Z12.4    Pap/cotest pending 2/15/24     Relevant Orders    THINPREP PAP    Visit for screening mammogram     Z12.31    Order given 2/15/24     Relevant Orders    BI mammo bilateral screening tomosynthesis    Pelvic pain     R10.2    Intermittent unilateral pelvic pain that alternates sides  Weekly  Order given for US     Relevant Orders    US PELVIS TRANSABDOMINAL WITH TRANSVAGINAL        Nola Darnell, DO

## 2024-02-16 RX ORDER — TRIAMTERENE AND HYDROCHLOROTHIAZIDE 37.5; 25 MG/1; MG/1
CAPSULE ORAL
Qty: 90 CAPSULE | Refills: 0 | Status: SHIPPED | OUTPATIENT
Start: 2024-02-16 | End: 2024-05-25 | Stop reason: SDUPTHER

## 2024-02-27 ENCOUNTER — HOSPITAL ENCOUNTER (OUTPATIENT)
Dept: RADIOLOGY | Facility: HOSPITAL | Age: 54
Discharge: HOME | End: 2024-02-27
Payer: COMMERCIAL

## 2024-02-27 DIAGNOSIS — R10.2 PELVIC PAIN: ICD-10-CM

## 2024-02-27 DIAGNOSIS — Z12.31 VISIT FOR SCREENING MAMMOGRAM: ICD-10-CM

## 2024-02-27 PROCEDURE — 76830 TRANSVAGINAL US NON-OB: CPT | Performed by: RADIOLOGY

## 2024-02-27 PROCEDURE — 77067 SCR MAMMO BI INCL CAD: CPT | Performed by: RADIOLOGY

## 2024-02-27 PROCEDURE — 76856 US EXAM PELVIC COMPLETE: CPT

## 2024-02-27 PROCEDURE — 77063 BREAST TOMOSYNTHESIS BI: CPT | Performed by: RADIOLOGY

## 2024-02-27 PROCEDURE — 76856 US EXAM PELVIC COMPLETE: CPT | Performed by: RADIOLOGY

## 2024-02-27 PROCEDURE — 77067 SCR MAMMO BI INCL CAD: CPT

## 2024-03-08 ENCOUNTER — APPOINTMENT (OUTPATIENT)
Dept: NEUROLOGY | Facility: CLINIC | Age: 54
End: 2024-03-08
Payer: COMMERCIAL

## 2024-04-18 ENCOUNTER — APPOINTMENT (OUTPATIENT)
Dept: PRIMARY CARE | Facility: CLINIC | Age: 54
End: 2024-04-18
Payer: COMMERCIAL

## 2024-04-29 DIAGNOSIS — K21.9 GASTROESOPHAGEAL REFLUX DISEASE WITHOUT ESOPHAGITIS: ICD-10-CM

## 2024-04-29 RX ORDER — OMEPRAZOLE 40 MG/1
CAPSULE, DELAYED RELEASE ORAL
Qty: 180 CAPSULE | Refills: 1 | Status: SHIPPED | OUTPATIENT
Start: 2024-04-29

## 2024-05-01 ENCOUNTER — OFFICE VISIT (OUTPATIENT)
Dept: GASTROENTEROLOGY | Facility: CLINIC | Age: 54
End: 2024-05-01
Payer: COMMERCIAL

## 2024-05-01 VITALS — HEIGHT: 65 IN | BODY MASS INDEX: 45.65 KG/M2 | OXYGEN SATURATION: 96 % | HEART RATE: 112 BPM | WEIGHT: 274 LBS

## 2024-05-01 DIAGNOSIS — R19.4 CHANGE IN BOWEL HABITS: ICD-10-CM

## 2024-05-01 DIAGNOSIS — K58.9 IRRITABLE BOWEL SYNDROME, UNSPECIFIED TYPE: Primary | ICD-10-CM

## 2024-05-01 DIAGNOSIS — R14.0 BLOATING: ICD-10-CM

## 2024-05-01 PROCEDURE — 1036F TOBACCO NON-USER: CPT | Performed by: INTERNAL MEDICINE

## 2024-05-01 PROCEDURE — 99214 OFFICE O/P EST MOD 30 MIN: CPT | Performed by: INTERNAL MEDICINE

## 2024-05-01 RX ORDER — AMITRIPTYLINE HYDROCHLORIDE 10 MG/1
TABLET, FILM COATED ORAL NIGHTLY
Qty: 140 TABLET | Refills: 0 | Status: SHIPPED | OUTPATIENT
Start: 2024-05-01 | End: 2024-06-26

## 2024-05-01 ASSESSMENT — ENCOUNTER SYMPTOMS: SHORTNESS OF BREATH: 0

## 2024-05-01 NOTE — PATIENT INSTRUCTIONS
Start Amitriptyline:  Take 1 tablet (10 mg) by mouth once daily at bedtime for 14 days, THEN 2 tablets (20 mg) once daily at bedtime for 14 days, THEN 3 tablets (30 mg) once daily at bedtime for 14 days, THEN 4 tablets (40 mg) once daily at bedtime for 14 days. If symptoms improve during the course of this up-titration, then maintain that current dose and do not increase the dose further.  Do not take this medication prior to driving as it may cause drowsiness..     Continue to try to avoid food triggers.    Follow-up in the office in 3-6 months depending on how you are feeling.
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
## 2024-05-20 ENCOUNTER — TELEPHONE (OUTPATIENT)
Dept: PRIMARY CARE | Facility: CLINIC | Age: 54
End: 2024-05-20
Payer: COMMERCIAL

## 2024-05-20 DIAGNOSIS — I10 BENIGN HYPERTENSION: ICD-10-CM

## 2024-05-21 RX ORDER — TRIAMTERENE AND HYDROCHLOROTHIAZIDE 37.5; 25 MG/1; MG/1
CAPSULE ORAL
Qty: 90 CAPSULE | Refills: 0 | OUTPATIENT
Start: 2024-05-21

## 2024-05-25 RX ORDER — TRIAMTERENE AND HYDROCHLOROTHIAZIDE 37.5; 25 MG/1; MG/1
CAPSULE ORAL
Qty: 90 CAPSULE | Refills: 0 | Status: SHIPPED | OUTPATIENT
Start: 2024-05-25

## 2024-05-25 NOTE — TELEPHONE ENCOUNTER
MEDICATION REFILL    Pt only has 1 dose left on hand.     Pharmacy Name:     [facility] /   Medication requested   Triamterene -hydrochlorothiazide 37.5 -25mg  Dosage    1 x daily  Quantity       30 days or hold over  Allergies    none given  Date of last visit   08/21/2023  Date of Next Appointment    05/30/2024

## 2024-05-30 ENCOUNTER — OFFICE VISIT (OUTPATIENT)
Dept: PRIMARY CARE | Facility: CLINIC | Age: 54
End: 2024-05-30
Payer: COMMERCIAL

## 2024-05-30 ENCOUNTER — HOSPITAL ENCOUNTER (OUTPATIENT)
Dept: RADIOLOGY | Facility: CLINIC | Age: 54
Discharge: HOME | End: 2024-05-30
Payer: COMMERCIAL

## 2024-05-30 VITALS
WEIGHT: 272 LBS | HEART RATE: 90 BPM | BODY MASS INDEX: 45.26 KG/M2 | DIASTOLIC BLOOD PRESSURE: 80 MMHG | TEMPERATURE: 97.3 F | SYSTOLIC BLOOD PRESSURE: 136 MMHG | OXYGEN SATURATION: 96 %

## 2024-05-30 DIAGNOSIS — I10 BENIGN HYPERTENSION: Primary | ICD-10-CM

## 2024-05-30 DIAGNOSIS — E78.2 MIXED HYPERLIPIDEMIA: ICD-10-CM

## 2024-05-30 DIAGNOSIS — I10 BENIGN HYPERTENSION: ICD-10-CM

## 2024-05-30 DIAGNOSIS — M25.521 ELBOW PAIN, RIGHT: ICD-10-CM

## 2024-05-30 DIAGNOSIS — M25.511 CHRONIC RIGHT SHOULDER PAIN: ICD-10-CM

## 2024-05-30 DIAGNOSIS — M25.552 PAIN OF LEFT HIP: ICD-10-CM

## 2024-05-30 DIAGNOSIS — G89.29 CHRONIC RIGHT SHOULDER PAIN: ICD-10-CM

## 2024-05-30 DIAGNOSIS — B37.0 ORAL THRUSH: ICD-10-CM

## 2024-05-30 DIAGNOSIS — M19.90 ARTHRITIS: ICD-10-CM

## 2024-05-30 DIAGNOSIS — R43.0 ANOSMIA: ICD-10-CM

## 2024-05-30 DIAGNOSIS — M25.50 PAIN IN JOINT, MULTIPLE SITES: ICD-10-CM

## 2024-05-30 PROCEDURE — 3075F SYST BP GE 130 - 139MM HG: CPT | Performed by: FAMILY MEDICINE

## 2024-05-30 PROCEDURE — 73130 X-RAY EXAM OF HAND: CPT | Mod: RT

## 2024-05-30 PROCEDURE — 3079F DIAST BP 80-89 MM HG: CPT | Performed by: FAMILY MEDICINE

## 2024-05-30 PROCEDURE — 73502 X-RAY EXAM HIP UNI 2-3 VIEWS: CPT | Mod: LEFT SIDE | Performed by: RADIOLOGY

## 2024-05-30 PROCEDURE — 73502 X-RAY EXAM HIP UNI 2-3 VIEWS: CPT | Mod: LT

## 2024-05-30 PROCEDURE — 73080 X-RAY EXAM OF ELBOW: CPT | Mod: RIGHT SIDE | Performed by: RADIOLOGY

## 2024-05-30 PROCEDURE — 73130 X-RAY EXAM OF HAND: CPT | Mod: LEFT SIDE | Performed by: RADIOLOGY

## 2024-05-30 PROCEDURE — 73080 X-RAY EXAM OF ELBOW: CPT | Mod: RT

## 2024-05-30 PROCEDURE — 73130 X-RAY EXAM OF HAND: CPT | Mod: RIGHT SIDE | Performed by: RADIOLOGY

## 2024-05-30 PROCEDURE — 73030 X-RAY EXAM OF SHOULDER: CPT | Mod: RT

## 2024-05-30 PROCEDURE — 99214 OFFICE O/P EST MOD 30 MIN: CPT | Performed by: FAMILY MEDICINE

## 2024-05-30 PROCEDURE — 73030 X-RAY EXAM OF SHOULDER: CPT | Mod: RIGHT SIDE | Performed by: RADIOLOGY

## 2024-05-30 PROCEDURE — 1036F TOBACCO NON-USER: CPT | Performed by: FAMILY MEDICINE

## 2024-05-30 PROCEDURE — 73130 X-RAY EXAM OF HAND: CPT | Mod: LT

## 2024-05-30 RX ORDER — NYSTATIN 100000 [USP'U]/ML
500000 SUSPENSION ORAL 4 TIMES DAILY
Qty: 200 ML | Refills: 1 | Status: SHIPPED | OUTPATIENT
Start: 2024-05-30 | End: 2024-06-09

## 2024-05-30 RX ORDER — METHYLPREDNISOLONE 4 MG/1
TABLET ORAL
Qty: 21 TABLET | Refills: 0 | Status: SHIPPED | OUTPATIENT
Start: 2024-05-30 | End: 2024-06-06

## 2024-05-30 ASSESSMENT — ENCOUNTER SYMPTOMS: LIGHT-HEADEDNESS: 0

## 2024-05-30 ASSESSMENT — PATIENT HEALTH QUESTIONNAIRE - PHQ9
SUM OF ALL RESPONSES TO PHQ9 QUESTIONS 1 AND 2: 0
1. LITTLE INTEREST OR PLEASURE IN DOING THINGS: NOT AT ALL
2. FEELING DOWN, DEPRESSED OR HOPELESS: NOT AT ALL

## 2024-05-30 NOTE — PATIENT INSTRUCTIONS
Xrays     Trial steroids     You can take nonsteroidal anti-inflammatories for your pain: Such as ibuprofen or Aleve.  It is important he follow the instructions on the labeling with these medications, taken with food.  Watch for stomach upset with these medications.  Tylenol is also helpful for pain, this medication does not treat inflammation, however, generally tends to not cause stomach upset.    You have osteoarthritis which is the wear and tear arthritis that we see as people age.  Most people get arthritis as they get older.  Typically we see this arthritis more substantially in the larger joints in the body such as hips, knees, back, shoulders.  Exercising can help with arthritic pain.  It is good to keep your weight down.  This type of arthritis is not reversible.  There are things you can take to treat the symptoms.  Sometimes some over-the-counter joint supplements like glucosamine, chondroitin, Osteo Bi-Flex can help.  Turmeric over-the-counter can help with inflammation  If you are able to take anti-inflammatories such as Advil, Aleve these can be helpful.  Tylenol can help somewhat with the pain also.  Some people get benefit from topical medication such as lidocaine or Voltaren gel  which are both over-the-counter.  Also topical medications can help with pain ; such as topical Lidocaine or topical Voltaren    Thrush treatment first if persisting trouble with smell after that would consider nasal spray    Let me know if persists

## 2024-05-30 NOTE — PROGRESS NOTES
Subjective   Patient ID: Stephanie Lira is a 53 y.o. female who presents for hip and hand pain. States she has been taking Tylenol OTC. Pt also has right elbow pain; hit her elbow pretty hard one month ago.C/O nodules on her middle fingers.Sense of taste and smell have not returned since covid. Last concern pt has has is possible tonsil stones the past few months; coughing up hard green balls at times.       Hip  pain  left ,  pain intermittently   Was bilateral now left >right   Lateral hip   Hand pain  Right shoulder   Right elbow had trauma   Pain with lifting     Tylenol mild relief   Nodularity on fingers   Cramping in hands       Hypertension  : Patient is taking blood pressure medications as directed.  Blood pressures have been averaging:  Pt denies Chest Pain or Shortness of Breath      Decreased sense of smell COVID , 2 years   No nasal sprays       Some trouble swallowing , getting food down   Seeing GI            Review of Systems   Neurological:  Negative for light-headedness.       Objective   /80   Pulse 90   Temp 36.3 °C (97.3 °F)   Wt 123 kg (272 lb)   SpO2 96%   BMI 45.26 kg/m²     Physical Exam  Constitutional:       Appearance: Normal appearance. She is well-developed.   HENT:      Nose: Nose normal.      Mouth/Throat:      Comments: White patches on the back of the throat and tongue  Cardiovascular:      Rate and Rhythm: Normal rate and regular rhythm.      Heart sounds: Normal heart sounds. No murmur heard.  Pulmonary:      Effort: Pulmonary effort is normal.      Breath sounds: Normal breath sounds.   Musculoskeletal:      Comments: Arthritic nodularity at the DIPs on both hands with some tenderness  Right elbow with tenderness at the olecranon and lateral epicondyles  Right shoulder with pain over the trapezius posteriorly  Some pain with range of motion  Left hip with pain with movement and pain along the lateral hip   Neurological:      General: No focal deficit present.       Mental Status: She is alert.   Psychiatric:         Mood and Affect: Mood normal.         Behavior: Behavior normal.         Assessment/Plan   Problem List Items Addressed This Visit             ICD-10-CM    Benign hypertension - Primary I10    Relevant Orders    XR shoulder right 2+ views    XR elbow right 1-2 views    XR hand left 3+ views    XR hand right 3+ views    CBC    Comprehensive Metabolic Panel    Thyroid Stimulating Hormone    Lipid Panel    Sedimentation Rate    Rheumatoid Factor    Mixed hyperlipidemia E78.2    Relevant Orders    CBC    Comprehensive Metabolic Panel    Thyroid Stimulating Hormone    Lipid Panel    Sedimentation Rate    Rheumatoid Factor     Other Visit Diagnoses         Codes    Anosmia     R43.0    Relevant Orders    XR shoulder right 2+ views    XR elbow right 1-2 views    XR hand left 3+ views    XR hand right 3+ views    CBC    Comprehensive Metabolic Panel    Thyroid Stimulating Hormone    Lipid Panel    Sedimentation Rate    Rheumatoid Factor    Pain of left hip     M25.552    Relevant Medications    methylPREDNISolone (Medrol Dospak) 4 mg tablets    Other Relevant Orders    XR shoulder right 2+ views    XR elbow right 1-2 views    XR hand left 3+ views    XR hand right 3+ views    CBC    Comprehensive Metabolic Panel    Thyroid Stimulating Hormone    Lipid Panel    Sedimentation Rate    Rheumatoid Factor    XR hip left with pelvis when performed 2 or 3 views    Arthritis     M19.90    Relevant Medications    methylPREDNISolone (Medrol Dospak) 4 mg tablets    Other Relevant Orders    XR shoulder right 2+ views    XR elbow right 1-2 views    XR hand left 3+ views    XR hand right 3+ views    CBC    Comprehensive Metabolic Panel    Thyroid Stimulating Hormone    Lipid Panel    Sedimentation Rate    Rheumatoid Factor    Elbow pain, right     M25.521    Relevant Medications    methylPREDNISolone (Medrol Dospak) 4 mg tablets    Other Relevant Orders    XR shoulder right 2+ views     XR elbow right 1-2 views    XR hand left 3+ views    XR hand right 3+ views    CBC    Comprehensive Metabolic Panel    Thyroid Stimulating Hormone    Lipid Panel    Sedimentation Rate    Rheumatoid Factor    Chronic right shoulder pain     M25.511, G89.29    Relevant Orders    XR shoulder right 2+ views    XR elbow right 1-2 views    XR hand left 3+ views    XR hand right 3+ views    CBC    Comprehensive Metabolic Panel    Thyroid Stimulating Hormone    Lipid Panel    Sedimentation Rate    Rheumatoid Factor    Oral thrush     B37.0    Relevant Medications    nystatin (Mycostatin) 100,000 unit/mL suspension    Other Relevant Orders    XR shoulder right 2+ views    XR elbow right 1-2 views    XR hand left 3+ views    XR hand right 3+ views    CBC    Comprehensive Metabolic Panel    Thyroid Stimulating Hormone    Lipid Panel    Sedimentation Rate    Rheumatoid Factor    Pain in joint, multiple sites     M25.50    Relevant Orders    CBC    Comprehensive Metabolic Panel    Thyroid Stimulating Hormone    Lipid Panel    Sedimentation Rate    Rheumatoid Factor

## 2024-05-31 ENCOUNTER — LAB (OUTPATIENT)
Dept: LAB | Facility: LAB | Age: 54
End: 2024-05-31
Payer: COMMERCIAL

## 2024-05-31 DIAGNOSIS — M25.50 PAIN IN JOINT, MULTIPLE SITES: ICD-10-CM

## 2024-05-31 DIAGNOSIS — M25.552 PAIN OF LEFT HIP: ICD-10-CM

## 2024-05-31 DIAGNOSIS — I10 BENIGN HYPERTENSION: ICD-10-CM

## 2024-05-31 DIAGNOSIS — M25.521 ELBOW PAIN, RIGHT: ICD-10-CM

## 2024-05-31 DIAGNOSIS — M25.511 CHRONIC RIGHT SHOULDER PAIN: ICD-10-CM

## 2024-05-31 DIAGNOSIS — B37.0 ORAL THRUSH: ICD-10-CM

## 2024-05-31 DIAGNOSIS — R43.0 ANOSMIA: ICD-10-CM

## 2024-05-31 DIAGNOSIS — E78.2 MIXED HYPERLIPIDEMIA: ICD-10-CM

## 2024-05-31 DIAGNOSIS — G89.29 CHRONIC RIGHT SHOULDER PAIN: ICD-10-CM

## 2024-05-31 DIAGNOSIS — M19.90 ARTHRITIS: ICD-10-CM

## 2024-05-31 LAB
ALBUMIN SERPL BCP-MCNC: 4.3 G/DL (ref 3.4–5)
ALP SERPL-CCNC: 89 U/L (ref 33–110)
ALT SERPL W P-5'-P-CCNC: 60 U/L (ref 7–45)
ANION GAP SERPL CALC-SCNC: 14 MMOL/L (ref 10–20)
AST SERPL W P-5'-P-CCNC: 52 U/L (ref 9–39)
BILIRUB SERPL-MCNC: 0.6 MG/DL (ref 0–1.2)
BUN SERPL-MCNC: 12 MG/DL (ref 6–23)
CALCIUM SERPL-MCNC: 9.9 MG/DL (ref 8.6–10.3)
CHLORIDE SERPL-SCNC: 100 MMOL/L (ref 98–107)
CHOLEST SERPL-MCNC: 264 MG/DL (ref 0–199)
CHOLESTEROL/HDL RATIO: 4.9
CO2 SERPL-SCNC: 30 MMOL/L (ref 21–32)
CREAT SERPL-MCNC: 0.72 MG/DL (ref 0.5–1.05)
EGFRCR SERPLBLD CKD-EPI 2021: >90 ML/MIN/1.73M*2
ERYTHROCYTE [DISTWIDTH] IN BLOOD BY AUTOMATED COUNT: 14.1 % (ref 11.5–14.5)
ERYTHROCYTE [SEDIMENTATION RATE] IN BLOOD BY WESTERGREN METHOD: 39 MM/H (ref 0–30)
GLUCOSE SERPL-MCNC: 99 MG/DL (ref 74–99)
HCT VFR BLD AUTO: 48 % (ref 36–46)
HDLC SERPL-MCNC: 54.1 MG/DL
HGB BLD-MCNC: 15 G/DL (ref 12–16)
LDLC SERPL CALC-MCNC: 169 MG/DL
MCH RBC QN AUTO: 28.4 PG (ref 26–34)
MCHC RBC AUTO-ENTMCNC: 31.3 G/DL (ref 32–36)
MCV RBC AUTO: 91 FL (ref 80–100)
NON HDL CHOLESTEROL: 210 MG/DL (ref 0–149)
NRBC BLD-RTO: 0 /100 WBCS (ref 0–0)
PLATELET # BLD AUTO: 302 X10*3/UL (ref 150–450)
POTASSIUM SERPL-SCNC: 4.4 MMOL/L (ref 3.5–5.3)
PROT SERPL-MCNC: 7.5 G/DL (ref 6.4–8.2)
RBC # BLD AUTO: 5.29 X10*6/UL (ref 4–5.2)
RHEUMATOID FACT SER NEPH-ACNC: <10 IU/ML (ref 0–15)
SODIUM SERPL-SCNC: 140 MMOL/L (ref 136–145)
TRIGL SERPL-MCNC: 205 MG/DL (ref 0–149)
TSH SERPL-ACNC: 2.52 MIU/L (ref 0.44–3.98)
VLDL: 41 MG/DL (ref 0–40)
WBC # BLD AUTO: 5.2 X10*3/UL (ref 4.4–11.3)

## 2024-05-31 PROCEDURE — 85652 RBC SED RATE AUTOMATED: CPT

## 2024-05-31 PROCEDURE — 85027 COMPLETE CBC AUTOMATED: CPT

## 2024-05-31 PROCEDURE — 36415 COLL VENOUS BLD VENIPUNCTURE: CPT

## 2024-05-31 PROCEDURE — 80061 LIPID PANEL: CPT

## 2024-05-31 PROCEDURE — 86431 RHEUMATOID FACTOR QUANT: CPT

## 2024-05-31 PROCEDURE — 80053 COMPREHEN METABOLIC PANEL: CPT

## 2024-05-31 PROCEDURE — 84443 ASSAY THYROID STIM HORMONE: CPT

## 2024-06-13 ENCOUNTER — OFFICE VISIT (OUTPATIENT)
Dept: PRIMARY CARE | Facility: CLINIC | Age: 54
End: 2024-06-13
Payer: COMMERCIAL

## 2024-06-13 VITALS
DIASTOLIC BLOOD PRESSURE: 86 MMHG | WEIGHT: 269 LBS | SYSTOLIC BLOOD PRESSURE: 152 MMHG | HEIGHT: 65 IN | HEART RATE: 94 BPM | BODY MASS INDEX: 44.82 KG/M2 | OXYGEN SATURATION: 95 %

## 2024-06-13 DIAGNOSIS — L71.9 ROSACEA: ICD-10-CM

## 2024-06-13 DIAGNOSIS — M25.50 ARTHRALGIA, UNSPECIFIED JOINT: ICD-10-CM

## 2024-06-13 DIAGNOSIS — E78.2 MIXED HYPERLIPIDEMIA: Primary | ICD-10-CM

## 2024-06-13 DIAGNOSIS — B37.0 THRUSH: ICD-10-CM

## 2024-06-13 DIAGNOSIS — I10 BENIGN HYPERTENSION: ICD-10-CM

## 2024-06-13 DIAGNOSIS — R53.83 FATIGUE, UNSPECIFIED TYPE: ICD-10-CM

## 2024-06-13 DIAGNOSIS — M25.50 PAIN IN JOINT, MULTIPLE SITES: ICD-10-CM

## 2024-06-13 DIAGNOSIS — R06.83 SNORING: ICD-10-CM

## 2024-06-13 PROCEDURE — 3079F DIAST BP 80-89 MM HG: CPT | Performed by: FAMILY MEDICINE

## 2024-06-13 PROCEDURE — 99214 OFFICE O/P EST MOD 30 MIN: CPT | Performed by: FAMILY MEDICINE

## 2024-06-13 PROCEDURE — 1036F TOBACCO NON-USER: CPT | Performed by: FAMILY MEDICINE

## 2024-06-13 PROCEDURE — 3077F SYST BP >= 140 MM HG: CPT | Performed by: FAMILY MEDICINE

## 2024-06-13 RX ORDER — PREDNISONE 10 MG/1
TABLET ORAL DAILY
Qty: 30 TABLET | Refills: 0 | Status: SHIPPED | OUTPATIENT
Start: 2024-06-13 | End: 2024-06-25

## 2024-06-13 RX ORDER — FLUCONAZOLE 200 MG/1
200 TABLET ORAL DAILY
Qty: 7 TABLET | Refills: 0 | Status: SHIPPED | OUTPATIENT
Start: 2024-06-13 | End: 2024-06-20

## 2024-06-13 ASSESSMENT — PATIENT HEALTH QUESTIONNAIRE - PHQ9
1. LITTLE INTEREST OR PLEASURE IN DOING THINGS: NOT AT ALL
2. FEELING DOWN, DEPRESSED OR HOPELESS: NOT AT ALL
SUM OF ALL RESPONSES TO PHQ9 QUESTIONS 1 AND 2: 0

## 2024-06-13 NOTE — PROGRESS NOTES
"Subjective   Patient ID: Stephanie Lira is a 53 y.o. female who presents for Follow-up. States she finished the steroids for her hip, elbow and shoulder pain; pt also completed the oral thrush tx but feels it may not be resolved completely. Last concern is regarding her labs; states she was advised to discuss them with you per ELISEO as he was covering.        Left hip   Right shoulder and right elbow painful     Pain with lifting with elbow     More chronic with hips since last year   Shoulder for months :    No swelling   Hands with intermittent cramping pain  1 yr       Fam hx :  SLE: niece  , o/a     Rosacea red on cheeks     Fatigue   Some snoring   No stopping breathing     Nonsmoker     On amitriptyline from GI dr     Trying to lose weight and watch diet              Review of Systems    Objective   /86   Pulse 94   Ht 1.651 m (5' 5\")   Wt 122 kg (269 lb)   SpO2 95%   BMI 44.76 kg/m²     Physical Exam  Constitutional:       Appearance: Normal appearance. She is well-developed.   HENT:      Head:      Comments: Maller red rash     Mouth/Throat:      Comments: Mouth :  slight white patches on tongue   Cardiovascular:      Rate and Rhythm: Normal rate and regular rhythm.      Heart sounds: Normal heart sounds. No murmur heard.  Pulmonary:      Effort: Pulmonary effort is normal.      Breath sounds: Normal breath sounds.   Musculoskeletal:      Comments: Little arthritic changes of the finger joints   Neurological:      General: No focal deficit present.      Mental Status: She is alert.   Psychiatric:         Mood and Affect: Mood normal.         Behavior: Behavior normal.         Assessment/Plan   Problem List Items Addressed This Visit             ICD-10-CM    Benign hypertension I10    Relevant Orders    ZEKE with Reflex to SHAGUFTA    Sedimentation Rate    Rheumatoid Factor    CK    Mixed hyperlipidemia - Primary E78.2    Relevant Orders    ZEKE with Reflex to SHAGUFTA    Sedimentation Rate    Rheumatoid Factor "    CK    Rosacea L71.9    Relevant Orders    ZEKE with Reflex to SHAGUFTA    Sedimentation Rate    Rheumatoid Factor    CK     Other Visit Diagnoses         Codes    Arthralgia, unspecified joint     M25.50    Relevant Medications    predniSONE (Deltasone) 10 mg tablet    Other Relevant Orders    ZEKE with Reflex to SHAGUFTA    Sedimentation Rate    Rheumatoid Factor    CK    Pain in joint, multiple sites     M25.50    Relevant Orders    ZEKE with Reflex to SHAGUFTA    Sedimentation Rate    Rheumatoid Factor    CK    Snoring     R06.83    Relevant Orders    ZEKE with Reflex to SHAGUFTA    Sedimentation Rate    Rheumatoid Factor    CK    Home sleep apnea test (HSAT)    Fatigue, unspecified type     R53.83    Relevant Orders    ZEKE with Reflex to SHAGUFTA    Sedimentation Rate    Rheumatoid Factor    CK    Home sleep apnea test (HSAT)    Thrush     B37.0    Relevant Medications    fluconazole (Diflucan) 200 mg tablet

## 2024-06-13 NOTE — PATIENT INSTRUCTIONS
Arthralgias with elevated sedimentation rate, rule out autoimmune arthritis, lupus  Red Malar  rash, likely rosacea rule out lupus  Less likely polymyalgia rheumatica question Sjogren's    Get your blood work as ordered.  You should hear from our office with results whether they are normal are not within a few days.  Please call the office if you do not hear from us.           After you get testing done you will get notified from our office with regards to your results whether they are normal or not.  If you are registered in the electronic health record we will send you information in that system.  If you have any questions or need clarification please feel free to call the office.         Your cholesterol levels were moderately high.  I would recommend regular exercise, keep your weight under control, and follow a low fat, low cholesterol diet.  Plant-based diets are ideal for reducing cholesterol and heart disease risk.  A diet high in fruits and vegetables, legumes, nuts, whole grains and fish with minimal amounts of animal products is recommended.  I would also suggest adding on a Fish oil supplement daily.  You can ry an over-the-counter sterol supplement such as SmartBalance Margarine, Cholest-off or similar.  I would like to repeat a fasting lipid profile in 6 months.    You should be getting cardiovascular exercise 3-5 times per week for 30-45 minutes.  This includes exercises such as running, brisk walking, biking or swimming.      Check more arthritic markers     Recommend sleep studies       Wait until blood done before starting steroid     No cholesterol meds yet  Continue to watch diet and exercise for now  Will hold on cholesterol meds due to muscle aches for now and see if diet and exercise work first

## 2024-06-14 ENCOUNTER — LAB (OUTPATIENT)
Dept: LAB | Facility: LAB | Age: 54
End: 2024-06-14
Payer: COMMERCIAL

## 2024-06-14 DIAGNOSIS — I10 BENIGN HYPERTENSION: ICD-10-CM

## 2024-06-14 DIAGNOSIS — L71.9 ROSACEA: ICD-10-CM

## 2024-06-14 DIAGNOSIS — M25.50 PAIN IN JOINT, MULTIPLE SITES: ICD-10-CM

## 2024-06-14 DIAGNOSIS — E78.2 MIXED HYPERLIPIDEMIA: ICD-10-CM

## 2024-06-14 DIAGNOSIS — R53.83 FATIGUE, UNSPECIFIED TYPE: ICD-10-CM

## 2024-06-14 DIAGNOSIS — M25.50 ARTHRALGIA, UNSPECIFIED JOINT: ICD-10-CM

## 2024-06-14 DIAGNOSIS — R06.83 SNORING: ICD-10-CM

## 2024-06-14 LAB
CK SERPL-CCNC: 61 U/L (ref 0–215)
ERYTHROCYTE [SEDIMENTATION RATE] IN BLOOD BY WESTERGREN METHOD: 35 MM/H (ref 0–30)
RHEUMATOID FACT SER NEPH-ACNC: 10 IU/ML (ref 0–15)

## 2024-06-14 PROCEDURE — 85652 RBC SED RATE AUTOMATED: CPT

## 2024-06-14 PROCEDURE — 86431 RHEUMATOID FACTOR QUANT: CPT

## 2024-06-14 PROCEDURE — 86225 DNA ANTIBODY NATIVE: CPT

## 2024-06-14 PROCEDURE — 86235 NUCLEAR ANTIGEN ANTIBODY: CPT

## 2024-06-14 PROCEDURE — 86038 ANTINUCLEAR ANTIBODIES: CPT

## 2024-06-14 PROCEDURE — 82550 ASSAY OF CK (CPK): CPT

## 2024-06-14 PROCEDURE — 36415 COLL VENOUS BLD VENIPUNCTURE: CPT

## 2024-06-18 LAB
ANA PATTERN: ABNORMAL
ANA SER QL HEP2 SUBST: POSITIVE
ANA TITR SER IF: ABNORMAL {TITER}
CENTROMERE B AB SER-ACNC: <0.2 AI
CHROMATIN AB SERPL-ACNC: <0.2 AI
DSDNA AB SER-ACNC: <1 IU/ML
ENA JO1 AB SER QL IA: <0.2 AI
ENA RNP AB SER IA-ACNC: <0.2 AI
ENA SCL70 AB SER QL IA: <0.2 AI
ENA SM AB SER IA-ACNC: <0.2 AI
ENA SM+RNP AB SER QL IA: <0.2 AI
ENA SS-A AB SER IA-ACNC: <0.2 AI
ENA SS-B AB SER IA-ACNC: <0.2 AI
RIBOSOMAL P AB SER-ACNC: <0.2 AI

## 2024-06-21 DIAGNOSIS — R76.8 POSITIVE ANA (ANTINUCLEAR ANTIBODY): ICD-10-CM

## 2024-06-21 DIAGNOSIS — M19.90 ARTHRITIS: Primary | ICD-10-CM

## 2024-06-25 ENCOUNTER — OFFICE VISIT (OUTPATIENT)
Dept: RHEUMATOLOGY | Facility: CLINIC | Age: 54
End: 2024-06-25
Payer: COMMERCIAL

## 2024-06-25 VITALS — DIASTOLIC BLOOD PRESSURE: 78 MMHG | BODY MASS INDEX: 44.76 KG/M2 | WEIGHT: 269 LBS | SYSTOLIC BLOOD PRESSURE: 127 MMHG

## 2024-06-25 DIAGNOSIS — M70.62 TROCHANTERIC BURSITIS OF LEFT HIP: ICD-10-CM

## 2024-06-25 DIAGNOSIS — M19.90 ARTHRITIS: ICD-10-CM

## 2024-06-25 DIAGNOSIS — R76.8 POSITIVE ANA (ANTINUCLEAR ANTIBODY): Primary | ICD-10-CM

## 2024-06-25 DIAGNOSIS — M77.11 LATERAL EPICONDYLITIS OF RIGHT ELBOW: ICD-10-CM

## 2024-06-25 PROCEDURE — 3078F DIAST BP <80 MM HG: CPT | Performed by: INTERNAL MEDICINE

## 2024-06-25 PROCEDURE — 99204 OFFICE O/P NEW MOD 45 MIN: CPT | Performed by: INTERNAL MEDICINE

## 2024-06-25 PROCEDURE — 1036F TOBACCO NON-USER: CPT | Performed by: INTERNAL MEDICINE

## 2024-06-25 PROCEDURE — 3074F SYST BP LT 130 MM HG: CPT | Performed by: INTERNAL MEDICINE
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
## 2024-06-25 NOTE — PATIENT INSTRUCTIONS
Take over-the-counter NSAIDs as discussed.  Use elbow band for tennis elbow.  Begin back and hip exercises for trochanteric bursitis.  Call me if any question.  Follow-up as needed.

## 2024-06-28 DIAGNOSIS — K58.9 IRRITABLE BOWEL SYNDROME, UNSPECIFIED TYPE: ICD-10-CM

## 2024-06-28 DIAGNOSIS — R14.0 BLOATING: ICD-10-CM

## 2024-06-28 DIAGNOSIS — R19.4 CHANGE IN BOWEL HABITS: ICD-10-CM

## 2024-06-28 RX ORDER — AMITRIPTYLINE HYDROCHLORIDE 10 MG/1
40 TABLET, FILM COATED ORAL NIGHTLY
Qty: 120 TABLET | Refills: 2 | Status: SHIPPED | OUTPATIENT
Start: 2024-06-28

## 2024-07-24 DIAGNOSIS — F32.A ANXIETY AND DEPRESSION: ICD-10-CM

## 2024-07-24 DIAGNOSIS — F41.9 ANXIETY AND DEPRESSION: ICD-10-CM

## 2024-07-24 DIAGNOSIS — I10 BENIGN HYPERTENSION: ICD-10-CM

## 2024-07-24 DIAGNOSIS — E78.2 COMBINED HYPERLIPIDEMIA: ICD-10-CM

## 2024-07-24 RX ORDER — DESVENLAFAXINE SUCCINATE 25 MG/1
TABLET, EXTENDED RELEASE ORAL
Qty: 90 TABLET | Refills: 1 | Status: SHIPPED | OUTPATIENT
Start: 2024-07-24

## 2024-08-18 DIAGNOSIS — I10 BENIGN HYPERTENSION: ICD-10-CM

## 2024-08-19 RX ORDER — TRIAMTERENE AND HYDROCHLOROTHIAZIDE 37.5; 25 MG/1; MG/1
CAPSULE ORAL
Qty: 90 CAPSULE | Refills: 1 | Status: SHIPPED | OUTPATIENT
Start: 2024-08-19

## 2024-09-17 ENCOUNTER — APPOINTMENT (OUTPATIENT)
Dept: GASTROENTEROLOGY | Facility: CLINIC | Age: 54
End: 2024-09-17
Payer: COMMERCIAL

## 2024-09-26 DIAGNOSIS — K58.9 IRRITABLE BOWEL SYNDROME, UNSPECIFIED TYPE: ICD-10-CM

## 2024-09-26 DIAGNOSIS — R14.0 BLOATING: ICD-10-CM

## 2024-09-26 DIAGNOSIS — R19.4 CHANGE IN BOWEL HABITS: ICD-10-CM

## 2024-09-27 RX ORDER — AMITRIPTYLINE HYDROCHLORIDE 10 MG/1
TABLET, FILM COATED ORAL
Qty: 360 TABLET | Refills: 0 | Status: SHIPPED | OUTPATIENT
Start: 2024-09-27

## 2024-12-04 ENCOUNTER — APPOINTMENT (OUTPATIENT)
Dept: GASTROENTEROLOGY | Facility: CLINIC | Age: 54
End: 2024-12-04
Payer: COMMERCIAL

## 2024-12-28 DIAGNOSIS — E78.2 COMBINED HYPERLIPIDEMIA: ICD-10-CM

## 2024-12-28 DIAGNOSIS — F41.9 ANXIETY AND DEPRESSION: ICD-10-CM

## 2024-12-28 DIAGNOSIS — F32.A ANXIETY AND DEPRESSION: ICD-10-CM

## 2024-12-28 DIAGNOSIS — I10 BENIGN HYPERTENSION: ICD-10-CM

## 2024-12-29 DIAGNOSIS — R19.4 CHANGE IN BOWEL HABITS: ICD-10-CM

## 2024-12-29 DIAGNOSIS — R14.0 BLOATING: ICD-10-CM

## 2024-12-29 DIAGNOSIS — K58.9 IRRITABLE BOWEL SYNDROME, UNSPECIFIED TYPE: ICD-10-CM

## 2024-12-30 RX ORDER — AMITRIPTYLINE HYDROCHLORIDE 10 MG/1
TABLET, FILM COATED ORAL
Qty: 360 TABLET | Refills: 0 | Status: SHIPPED | OUTPATIENT
Start: 2024-12-30

## 2024-12-30 RX ORDER — DESVENLAFAXINE SUCCINATE 25 MG/1
TABLET, EXTENDED RELEASE ORAL
Qty: 90 TABLET | Refills: 1 | Status: SHIPPED | OUTPATIENT
Start: 2024-12-30

## 2025-02-14 ENCOUNTER — APPOINTMENT (OUTPATIENT)
Dept: GASTROENTEROLOGY | Facility: CLINIC | Age: 55
End: 2025-02-14
Payer: COMMERCIAL

## 2025-02-19 DIAGNOSIS — I10 BENIGN HYPERTENSION: ICD-10-CM

## 2025-02-20 RX ORDER — TRIAMTERENE AND HYDROCHLOROTHIAZIDE 37.5; 25 MG/1; MG/1
CAPSULE ORAL
Qty: 90 CAPSULE | Refills: 0 | Status: SHIPPED | OUTPATIENT
Start: 2025-02-20

## 2025-03-03 DIAGNOSIS — R19.4 CHANGE IN BOWEL HABITS: ICD-10-CM

## 2025-03-03 DIAGNOSIS — K58.9 IRRITABLE BOWEL SYNDROME, UNSPECIFIED TYPE: ICD-10-CM

## 2025-03-03 DIAGNOSIS — R14.0 BLOATING: ICD-10-CM

## 2025-03-03 RX ORDER — AMITRIPTYLINE HYDROCHLORIDE 10 MG/1
TABLET, FILM COATED ORAL
Qty: 360 TABLET | Refills: 0 | Status: SHIPPED | OUTPATIENT
Start: 2025-03-03

## 2025-03-26 ENCOUNTER — TELEPHONE (OUTPATIENT)
Dept: PRIMARY CARE | Facility: CLINIC | Age: 55
End: 2025-03-26
Payer: COMMERCIAL

## 2025-03-26 DIAGNOSIS — K58.9 IRRITABLE BOWEL SYNDROME, UNSPECIFIED TYPE: ICD-10-CM

## 2025-03-26 DIAGNOSIS — R19.4 CHANGE IN BOWEL HABITS: ICD-10-CM

## 2025-03-26 DIAGNOSIS — R14.0 BLOATING: ICD-10-CM

## 2025-03-26 RX ORDER — AMITRIPTYLINE HYDROCHLORIDE 10 MG/1
10 TABLET, FILM COATED ORAL NIGHTLY
Qty: 90 TABLET | Refills: 0 | Status: SHIPPED | OUTPATIENT
Start: 2025-03-26

## 2025-03-26 NOTE — TELEPHONE ENCOUNTER
Stephanie Lira [address] [place] [address] Clinical Support Staff  Phone Number: [phone]     The Desvenlafax my pharmacy said the insurance wants a preauthorization or wants me to switch meds. I am out of this med so they told me to reach out to you. Thank you          Medication  (Newest Message First)  View All Conversations on this Encounter  Stephanie LOVE [address] [place] [address] Clinical Support Staff (supporting Shadia Garcia MD)1 hour ago (7:29 AM)       The Desvenlafax my pharmacy said the insurance wants a preauthorization or wants me to switch meds. I am out of this med so they told me to reach out to you. Thank you    Sent pt my chart message regarding this; PA in progress.

## 2025-03-26 NOTE — TELEPHONE ENCOUNTER
Jessy Landa MA  Phone Number: [phone]            Previous Messages    Desvenlafaxine  (Newest Message First)  View All Conversations on this Encounter   Jessy Martinez routed conversation to You2 hours ago (12:49 PM)     Jessy Martinez2 hours ago (12:49 PM)     PL  Pts insurance company called stating they need the prior auth to be faxed to [phone] as URGENT pt has been out of medication for 3 days       Unsigned   Note         Stephanie LOVE Do [place] Clinical Support Staff (supporting You)6 hours ago (9:21 AM)       No they have not reached out to me yet that is just what the pharmacy told me yesterday. Thank you for letting me know what's going on I appreciate it.       You  Stephanie Lira6 hours ago (9:16 AM)       Lashell Brown,     This is Phyllis Garcia's Medical Assistant; I submitted the Prior authorization on 3/21/25; still waiting on your insurance to respond. If they have reached out to you already and the med needs changed Dr. Garcia will be in the office next Monday.      Phoned pt's insurance; verbal PA taken care of; Auth # [id number] Pt aware of status.

## 2025-04-16 DIAGNOSIS — K21.9 GASTROESOPHAGEAL REFLUX DISEASE WITHOUT ESOPHAGITIS: ICD-10-CM

## 2025-04-17 RX ORDER — OMEPRAZOLE 40 MG/1
40 CAPSULE, DELAYED RELEASE ORAL DAILY
Qty: 90 CAPSULE | Refills: 0 | Status: SHIPPED | OUTPATIENT
Start: 2025-04-17

## 2025-05-07 ENCOUNTER — OFFICE VISIT (OUTPATIENT)
Dept: PRIMARY CARE | Facility: CLINIC | Age: 55
End: 2025-05-07
Payer: COMMERCIAL

## 2025-05-07 VITALS
HEIGHT: 65 IN | OXYGEN SATURATION: 93 % | BODY MASS INDEX: 47.65 KG/M2 | HEART RATE: 105 BPM | TEMPERATURE: 98.6 F | SYSTOLIC BLOOD PRESSURE: 158 MMHG | DIASTOLIC BLOOD PRESSURE: 88 MMHG | WEIGHT: 286 LBS

## 2025-05-07 DIAGNOSIS — J40 BRONCHITIS: Primary | ICD-10-CM

## 2025-05-07 PROCEDURE — 3008F BODY MASS INDEX DOCD: CPT | Performed by: FAMILY MEDICINE

## 2025-05-07 PROCEDURE — 3079F DIAST BP 80-89 MM HG: CPT | Performed by: FAMILY MEDICINE

## 2025-05-07 PROCEDURE — 3077F SYST BP >= 140 MM HG: CPT | Performed by: FAMILY MEDICINE

## 2025-05-07 PROCEDURE — 99213 OFFICE O/P EST LOW 20 MIN: CPT | Performed by: FAMILY MEDICINE

## 2025-05-07 PROCEDURE — 1036F TOBACCO NON-USER: CPT | Performed by: FAMILY MEDICINE

## 2025-05-07 RX ORDER — ALBUTEROL SULFATE 90 UG/1
2 INHALANT RESPIRATORY (INHALATION) EVERY 4 HOURS PRN
Qty: 8 G | Refills: 1 | Status: SHIPPED | OUTPATIENT
Start: 2025-05-07 | End: 2026-05-07

## 2025-05-07 RX ORDER — BENZONATATE 200 MG/1
200 CAPSULE ORAL 3 TIMES DAILY PRN
Qty: 42 CAPSULE | Refills: 0 | Status: SHIPPED | OUTPATIENT
Start: 2025-05-07 | End: 2025-06-06

## 2025-05-07 RX ORDER — AMOXICILLIN AND CLAVULANATE POTASSIUM 875; 125 MG/1; MG/1
875 TABLET, FILM COATED ORAL 2 TIMES DAILY
Qty: 20 TABLET | Refills: 0 | Status: SHIPPED | OUTPATIENT
Start: 2025-05-07 | End: 2025-05-17

## 2025-05-07 ASSESSMENT — PATIENT HEALTH QUESTIONNAIRE - PHQ9
SUM OF ALL RESPONSES TO PHQ9 QUESTIONS 1 AND 2: 0
2. FEELING DOWN, DEPRESSED OR HOPELESS: NOT AT ALL
1. LITTLE INTEREST OR PLEASURE IN DOING THINGS: NOT AT ALL

## 2025-05-07 ASSESSMENT — ENCOUNTER SYMPTOMS
SHORTNESS OF BREATH: 1
COUGH: 1
FEVER: 0

## 2025-05-07 NOTE — PATIENT INSTRUCTIONS
You may take over-the-counter medications as needed for symptom relief.  Call the office if your symptoms worsen such as high fever and worsening cough or increase in symptoms.     Follow up if symptoms worsen or persist.       Continue prednisone     Add Abx     Cough meds and inhaler

## 2025-05-07 NOTE — PROGRESS NOTES
"Subjective   Patient ID: Stephanie Lira is a 54 y.o. female who presents for Cough. Sx's onset one week ago; has took one weeks worth off Prednisone she had at home in addition to Dayquil and Coricidin HBP.     1 week  Cough   Dry  a little productive            Review of Systems   Constitutional:  Negative for fever.   HENT:  Negative for congestion.    Respiratory:  Positive for cough and shortness of breath.        Objective   /88   Pulse 105   Temp 37 °C (98.6 °F)   Ht 1.651 m (5' 5\")   Wt 130 kg (286 lb)   SpO2 93%   BMI 47.59 kg/m²     Physical Exam  Constitutional:       Appearance: Normal appearance.   HENT:      Head: Normocephalic and atraumatic.      Left Ear: Tympanic membrane and ear canal normal.      Ears:      Comments: Right TM distended     Nose: No nasal deformity.      Right Sinus: No maxillary sinus tenderness or frontal sinus tenderness.      Left Sinus: No maxillary sinus tenderness or frontal sinus tenderness.      Mouth/Throat:      Mouth: Mucous membranes are moist. No oral lesions.      Tongue: No lesions.      Pharynx: Oropharynx is clear.   Cardiovascular:      Rate and Rhythm: Normal rate and regular rhythm.   Pulmonary:      Effort: Pulmonary effort is normal.      Breath sounds: Normal breath sounds.      Comments: Upper chest congestion  Musculoskeletal:      Cervical back: No tenderness.   Lymphadenopathy:      Cervical: No cervical adenopathy.   Neurological:      Mental Status: She is alert.         Assessment/Plan   Problem List Items Addressed This Visit    None  Visit Diagnoses         Codes      Bronchitis    -  Primary J40    Relevant Medications    amoxicillin-clavulanate (Augmentin) 875-125 mg tablet    benzonatate (Tessalon) 200 mg capsule    albuterol (Ventolin HFA) 90 mcg/actuation inhaler               "

## 2025-05-22 ENCOUNTER — TELEPHONE (OUTPATIENT)
Dept: PRIMARY CARE | Facility: CLINIC | Age: 55
End: 2025-05-22
Payer: COMMERCIAL

## 2025-05-22 DIAGNOSIS — I10 BENIGN HYPERTENSION: ICD-10-CM

## 2025-05-22 RX ORDER — TRIAMTERENE AND HYDROCHLOROTHIAZIDE 37.5; 25 MG/1; MG/1
CAPSULE ORAL
Qty: 90 CAPSULE | Refills: 0 | Status: SHIPPED | OUTPATIENT
Start: 2025-05-22

## 2025-05-22 ASSESSMENT — PROMIS GLOBAL HEALTH SCALE
RATE_PHYSICAL_HEALTH: FAIR
CARRYOUT_SOCIAL_ACTIVITIES: GOOD
RATE_AVERAGE_FATIGUE: MODERATE
RATE_AVERAGE_PAIN: 5
EMOTIONAL_PROBLEMS: RARELY
RATE_QUALITY_OF_LIFE: GOOD
RATE_GENERAL_HEALTH: FAIR
RATE_MENTAL_HEALTH: GOOD
RATE_SOCIAL_SATISFACTION: GOOD
CARRYOUT_PHYSICAL_ACTIVITIES: MODERATELY

## 2025-05-22 NOTE — TELEPHONE ENCOUNTER
PT IS OUT OF MEDICATION     Rx Refill Request Telephone Encounter    Name:  Stephanie Lira  :  [id number]  Medication Name:    TRIAMTERENE hydrochlorothiazide 37.5-25MG Q/D   Specific Pharmacy location:  /   Date of last appointment:  2025  Date of next appointment:  2025  Best number to reach patient:  [phone]

## 2025-05-29 ENCOUNTER — HOSPITAL ENCOUNTER (OUTPATIENT)
Dept: RADIOLOGY | Facility: CLINIC | Age: 55
Discharge: HOME | End: 2025-05-29
Payer: COMMERCIAL

## 2025-05-29 ENCOUNTER — APPOINTMENT (OUTPATIENT)
Dept: PRIMARY CARE | Facility: CLINIC | Age: 55
End: 2025-05-29
Payer: COMMERCIAL

## 2025-05-29 VITALS
OXYGEN SATURATION: 98 % | DIASTOLIC BLOOD PRESSURE: 82 MMHG | BODY MASS INDEX: 47.48 KG/M2 | HEART RATE: 108 BPM | WEIGHT: 285 LBS | SYSTOLIC BLOOD PRESSURE: 138 MMHG | HEIGHT: 65 IN

## 2025-05-29 DIAGNOSIS — E78.2 MIXED HYPERLIPIDEMIA: ICD-10-CM

## 2025-05-29 DIAGNOSIS — Z00.00 ROUTINE GENERAL MEDICAL EXAMINATION AT A HEALTH CARE FACILITY: Primary | ICD-10-CM

## 2025-05-29 DIAGNOSIS — E66.01 MORBID OBESITY (MULTI): ICD-10-CM

## 2025-05-29 DIAGNOSIS — I10 BENIGN HYPERTENSION: ICD-10-CM

## 2025-05-29 DIAGNOSIS — K58.0 IRRITABLE BOWEL SYNDROME WITH DIARRHEA: ICD-10-CM

## 2025-05-29 DIAGNOSIS — F41.9 ANXIETY AND DEPRESSION: ICD-10-CM

## 2025-05-29 DIAGNOSIS — R05.2 SUBACUTE COUGH: ICD-10-CM

## 2025-05-29 DIAGNOSIS — Z00.00 ROUTINE GENERAL MEDICAL EXAMINATION AT A HEALTH CARE FACILITY: ICD-10-CM

## 2025-05-29 DIAGNOSIS — Z12.31 ENCOUNTER FOR SCREENING MAMMOGRAM FOR MALIGNANT NEOPLASM OF BREAST: ICD-10-CM

## 2025-05-29 DIAGNOSIS — F33.0 MILD EPISODE OF RECURRENT MAJOR DEPRESSIVE DISORDER: ICD-10-CM

## 2025-05-29 DIAGNOSIS — R53.83 FATIGUE, UNSPECIFIED TYPE: ICD-10-CM

## 2025-05-29 DIAGNOSIS — Z13.220 LIPID SCREENING: ICD-10-CM

## 2025-05-29 DIAGNOSIS — F32.A ANXIETY AND DEPRESSION: ICD-10-CM

## 2025-05-29 DIAGNOSIS — E78.2 COMBINED HYPERLIPIDEMIA: ICD-10-CM

## 2025-05-29 DIAGNOSIS — K14.0 GLOSSITIS: ICD-10-CM

## 2025-05-29 DIAGNOSIS — M79.10 MYALGIA: ICD-10-CM

## 2025-05-29 DIAGNOSIS — R06.83 SNORING: ICD-10-CM

## 2025-05-29 PROBLEM — S06.0XAA BRAIN CONCUSSION: Status: RESOLVED | Noted: 2023-05-22 | Resolved: 2025-05-29

## 2025-05-29 PROBLEM — D06.9 CIN III (CERVICAL INTRAEPITHELIAL NEOPLASIA GRADE III) WITH SEVERE DYSPLASIA: Status: RESOLVED | Noted: 2023-05-22 | Resolved: 2025-05-29

## 2025-05-29 PROBLEM — R41.3 AMNESIA: Status: RESOLVED | Noted: 2023-05-22 | Resolved: 2025-05-29

## 2025-05-29 PROBLEM — F07.81 POST CONCUSSIVE SYNDROME: Status: RESOLVED | Noted: 2023-05-22 | Resolved: 2025-05-29

## 2025-05-29 PROBLEM — R74.8 ELEVATED LIVER ENZYMES: Status: RESOLVED | Noted: 2023-05-22 | Resolved: 2025-05-29

## 2025-05-29 PROCEDURE — 3075F SYST BP GE 130 - 139MM HG: CPT | Performed by: FAMILY MEDICINE

## 2025-05-29 PROCEDURE — 1036F TOBACCO NON-USER: CPT | Performed by: FAMILY MEDICINE

## 2025-05-29 PROCEDURE — 3079F DIAST BP 80-89 MM HG: CPT | Performed by: FAMILY MEDICINE

## 2025-05-29 PROCEDURE — 99396 PREV VISIT EST AGE 40-64: CPT | Performed by: FAMILY MEDICINE

## 2025-05-29 PROCEDURE — 99214 OFFICE O/P EST MOD 30 MIN: CPT | Performed by: FAMILY MEDICINE

## 2025-05-29 PROCEDURE — 71046 X-RAY EXAM CHEST 2 VIEWS: CPT

## 2025-05-29 PROCEDURE — 3008F BODY MASS INDEX DOCD: CPT | Performed by: FAMILY MEDICINE

## 2025-05-29 RX ORDER — PREDNISONE 10 MG/1
TABLET ORAL
Qty: 26 TABLET | Refills: 0 | Status: SHIPPED | OUTPATIENT
Start: 2025-05-29 | End: 2025-06-09

## 2025-05-29 RX ORDER — DESVENLAFAXINE SUCCINATE 25 MG/1
25 TABLET, EXTENDED RELEASE ORAL DAILY
Qty: 90 TABLET | Refills: 3 | Status: SHIPPED | OUTPATIENT
Start: 2025-05-29

## 2025-05-29 RX ORDER — TRIAMTERENE AND HYDROCHLOROTHIAZIDE 37.5; 25 MG/1; MG/1
CAPSULE ORAL
Qty: 90 CAPSULE | Refills: 1 | Status: SHIPPED | OUTPATIENT
Start: 2025-05-29

## 2025-05-29 RX ORDER — OXYCODONE HYDROCHLORIDE 5 MG/1
TABLET, COATED ORAL
COMMUNITY
End: 2025-05-29 | Stop reason: ENTERED-IN-ERROR

## 2025-05-29 NOTE — PATIENT INSTRUCTIONS
You should be getting cardiovascular exercise 3-5 times per week for 30-45 minutes.  This includes exercises such as running, brisk walking, biking or swimming.     You should be getting cardiovascular exercise 3-5 times per week for 30-45 minutes.  This includes exercises such as running, brisk walking, biking or swimming.        It is important to actively try to reduce your weight to become healthier.  There are several things you can do to try and lose weight.  You should be getting 30-45 minutes of cardiovascular exercise 3-5 times per week, activities such as running and jogging treadmill swimming and brisk walking are helpful.  You will also need to watch your portion control, decrease calorie intake overall.  Following a low carbohydrate diet is the most successful way to lose weight and take it off long-term.  In order to achieve weight loss it is important that you track exactly what your calorie intake is during the day.  I usually recommend doing some sort of formal program or Matheus. there are lot of good Apps out there to help you follow your calorie intake such as weight watchers, Noom, Fitbit.  On average in order to achieve weight loss adult women should consume 1200 to 1500 daniel/day, adult men 1500 to 1800 daniel a day: However this varies based on physical activity.  It is recommended that you reduce the intake of sweets, sugar, reduce carbohydrate intake.  Healthy diets with more whole grains, vegetables, fruits, nuts and seeds with plant oils are healthier diets than animal-based fats.  Reduce your intake of white bread, grains, potatoes, processed foods.      If you have a BMI  (height per weight ratio)  that is > 30 or > 27  with risk factors such as diabetes, heart disease, hypertension or sleep apnea you may qualify for more intensive weight loss options.   I can give you a referral for to aggressively work on weight loss through a dietitian.  There are many options for bariatric intervention,  surgeries and procedures also.  There are also a few options for weight loss medications.  There is an over-the-counter medication called orlistat ( Yoan ) this works to prevent fat absorption, it can cause some diarrhea.    The GLP-1 medications: Mounjaro, Saxenda , Wegovy and Zepbound are injectables.  These medications  get you started on weight loss but you will need to continue with behavioral changes, dietary management and exercise to continue to keep the weight off.  If you are interested in trying 1 of these medications you need to check with your insurance formulary to see if they are covered.    If your insurance does not specifically cover the drug name Mounjaro, Saxenda,Wegovy or Zepbound WITHOUT a prior authorization then it is not covered for weight loss.    Ozempic,Semaglutide, Rybelsus, Trulicity are diabetic medications, not weight loss medications.  If your insurance states they need a prior authorization then they do not cover it for weight loss .  There are savings programs online for Zepbound and Wegovy, however these are only for commercially insured patients or self-pay patients.  Medicare does not cover any weight loss medications.  There are other options that may be more cost effective if your insurance company does not cover the medication.   [employer] does have a savings program for Zepbound you can check their website to see if you qualify. They also have a direct pharmacy option, I can prescribe these medications and send it to their pharmacy: The cost is $400 for 2.5 mg and $550 for the 5 mg, however this is provided in a vial not the easy to use pen.   There are numerous online pharmaceutical companies that are selling compounded injectable GLP-1 medications,  there is significant safety concerns about compounded injectable GLP-1 medications.  The ingredients may be coming from unknown sources so I would not recommend these.  There are some pharmacies that offer compounded sublingual  semaglutide.  If you can find this medication supplied by a [facility] approved pharmacy that is using the FDA approved semaglutide this is a safer bet.    If you do start 1 of these medications you will need to see me every 2 to 3 months for weight checks and reevaluations.

## 2025-05-29 NOTE — PROGRESS NOTES
Subjective   Patient ID: Stephanie Lira is a 54 y.o. female who presents for Annual Exam (Pt still has cough. Pt having mouth burning while eating.  Pt has had issues of total body pains after doing a lot of activity or walking.  Pt cannot lose weight.  Has been trying.  Some swelling of ankles after walking ).    Persistent cough  over the last , last 5-6 weeks   Some relief with Abx ,  taking cough meds   Minimal relief with steroid     No asthma in past   No smoker     Has had episodes of burning in mouth :  over the last few months:  has had episodes in past lasted 2 weeks regardless of foods , tongue and roof of mouth   Dry mouth , dentist recommended biotene   No dry eyes       Hypertension  : Patient is taking blood pressure medications as directed.  Blood pressures have been averagins   Pt denies Chest Pain or Shortness of Breath    Patient had evaluation by rheumatology last year, had some epicondylitis, bursitis, ZEKE felt to be insignificant    Has some general body aches and pains    History of abnormal Pap, seeing GYN Pap last year was normal    Trouble losing weight     on amitriptyline for GI sxs / bloating     , fatigue during day , didn't get sleep study     Anxiety and depression: On desvenlafaxine    Taking vit D and calcium     Up-to-date on Pap  Up-to-date on colonoscopy         Review of Systems     ROS :  ( No or Yes )  Any eye problems:    N  Frequent nasal congestion or sneezing:  N  Difficulty hearing:  N  Ear problems:   N  Asthma or wheezing:   N  Frequent cough:   N  Shortness of breath:N  Hemoptysis: N  Hx of TB: N  High blood pressure: y  Heart disease: N  Heart murmur:N  Chest pain or pressure with exertion:N  Leg pains with walking up hill: y  Fast heartbeat or palpitations:N  Varicose veins: N  Difficulty swallowing foods or liquids: y  Abdominal pains: y  Frequent indigestion or heartburn: N  Constipation: N  Diarrhea or loose stools: N  Weight changes recently: y  Change in  "bowel movements: y  An ulcer: N  Black stools: y  Jaundice, hepatitis or liver problems: y  Gallstones or gallbladder problems: y  Stomach or intestinal problems: y  Vomited blood : N  Blood in bowel movements: N  Sickle cell trait  or Anemia: N  Been refused as a blood donor: N  Problems with her kidney, bladder, or prostate: N  Loss of control of your urine: N  Pain or burning with urination: N  Blood in her urine: N  Trouble starting flow of urine: N  Frequent urination at night: N  History of venereal disease: N  Any skin problems: N  Diabetes: N  Thyroid disease: N  Frequent back pain: N  Pain or swelling around joints: y  Broken any bones: y  Frequent headaches: N  Dizziness: N  Have you ever had Seizures or convulsions: N  Have you ever temporarily lost control of your hand or foot : N   Had a stroke or been paralyzed : N  Temporarily lost your ability to speak: N  Fainted or lost consciousness: N  Hallucinations: N  Nervousness: N  Do you take medications for your nerves: N  Trouble falling asleep or staying asleep: y  Do you feel tired even after a good night sleep: y  Do you feel down in the dumps or depressed: N  Frequent crying: N  Using alcohol excessively: N  Any street drug use : N  Do have any other medical problems that are concerns :    Objective   /82   Pulse 108   Ht 1.651 m (5' 5\")   Wt 129 kg (285 lb)   SpO2 98%   BMI 47.43 kg/m²     Physical Exam  Constitutional:       General: She is not in acute distress.     Appearance: Normal appearance.   HENT:      Head: Normocephalic and atraumatic.      Right Ear: Tympanic membrane, ear canal and external ear normal.      Left Ear: Tympanic membrane, ear canal and external ear normal.      Nose: Nose normal.      Mouth/Throat:      Mouth: Mucous membranes are moist.      Pharynx: No oropharyngeal exudate or posterior oropharyngeal erythema.   Eyes:      Extraocular Movements: Extraocular movements intact.      Conjunctiva/sclera: " Conjunctivae normal.      Pupils: Pupils are equal, round, and reactive to light.   Cardiovascular:      Rate and Rhythm: Normal rate and regular rhythm.      Heart sounds: No murmur heard.  Pulmonary:      Effort: Pulmonary effort is normal.      Breath sounds: Normal breath sounds.      Comments: Moderate productive cough  Abdominal:      General: Bowel sounds are normal.      Palpations: Abdomen is soft.   Musculoskeletal:         General: Normal range of motion.      Cervical back: No rigidity.   Lymphadenopathy:      Cervical: No cervical adenopathy.   Skin:     General: Skin is warm and dry.      Findings: No rash.   Neurological:      General: No focal deficit present.      Mental Status: She is alert and oriented to person, place, and time.      Cranial Nerves: No cranial nerve deficit.      Gait: Gait normal.   Psychiatric:         Mood and Affect: Mood normal.         Behavior: Behavior normal.         Assessment/Plan   Problem List Items Addressed This Visit           ICD-10-CM    Benign hypertension I10    Relevant Medications    predniSONE (Deltasone) 10 mg tablet    triamterene-hydrochlorothiazid (Dyazide) 37.5-25 mg capsule    desvenlafaxine succinate (Pristiq) 25 mg 24 hour tablet    Other Relevant Orders    CBC and Auto Differential    Comprehensive Metabolic Panel    Thyroid Stimulating Hormone    Thyroxine, Free    Lipid Panel    Vitamin D 25-Hydroxy,Total (for eval of Vitamin D levels)    Sedimentation Rate    XR chest 2 views    ZEKE with Reflex to SHAGUFTA    Vitamin B12    Mixed hyperlipidemia E78.2    Relevant Medications    predniSONE (Deltasone) 10 mg tablet    desvenlafaxine succinate (Pristiq) 25 mg 24 hour tablet    Other Relevant Orders    CBC and Auto Differential    Comprehensive Metabolic Panel    Thyroid Stimulating Hormone    Thyroxine, Free    Lipid Panel    Vitamin D 25-Hydroxy,Total (for eval of Vitamin D levels)    Sedimentation Rate    XR chest 2 views    ZEKE with Reflex to SHAGUFTA     Vitamin B12    Irritable bowel syndrome with diarrhea K58.0    Relevant Medications    predniSONE (Deltasone) 10 mg tablet    Other Relevant Orders    CBC and Auto Differential    Comprehensive Metabolic Panel    Thyroid Stimulating Hormone    Thyroxine, Free    Lipid Panel    Vitamin D 25-Hydroxy,Total (for eval of Vitamin D levels)    Sedimentation Rate    XR chest 2 views    ZEKE with Reflex to SHAGUFTA    Vitamin B12    Morbid obesity (Multi) E66.01    Relevant Medications    predniSONE (Deltasone) 10 mg tablet    Other Relevant Orders    CBC and Auto Differential    Comprehensive Metabolic Panel    Thyroid Stimulating Hormone    Thyroxine, Free    Lipid Panel    Vitamin D 25-Hydroxy,Total (for eval of Vitamin D levels)    Sedimentation Rate    XR chest 2 views    ZEKE with Reflex to SHAGUFTA    Vitamin B12     Other Visit Diagnoses         Codes      Routine general medical examination at a health care facility    -  Primary Z00.00    Relevant Medications    predniSONE (Deltasone) 10 mg tablet    Other Relevant Orders    CBC and Auto Differential    Comprehensive Metabolic Panel    Thyroid Stimulating Hormone    Thyroxine, Free    Lipid Panel    Vitamin D 25-Hydroxy,Total (for eval of Vitamin D levels)    Sedimentation Rate    XR chest 2 views    ZEKE with Reflex to SHAGUFTA    Vitamin B12      Lipid screening     Z13.220    Relevant Medications    predniSONE (Deltasone) 10 mg tablet    Other Relevant Orders    CBC and Auto Differential    Comprehensive Metabolic Panel    Thyroid Stimulating Hormone    Thyroxine, Free    Lipid Panel    Vitamin D 25-Hydroxy,Total (for eval of Vitamin D levels)    Sedimentation Rate    XR chest 2 views    ZEKE with Reflex to SHAGUFTA    Vitamin B12      Encounter for screening mammogram for malignant neoplasm of breast     Z12.31    Relevant Medications    predniSONE (Deltasone) 10 mg tablet    Other Relevant Orders    BI mammo bilateral screening tomosynthesis    CBC and Auto Differential     Comprehensive Metabolic Panel    Thyroid Stimulating Hormone    Thyroxine, Free    Lipid Panel    Vitamin D 25-Hydroxy,Total (for eval of Vitamin D levels)    Sedimentation Rate    XR chest 2 views    ZEKE with Reflex to SHAGUFTA    Vitamin B12      Subacute cough     R05.2    Relevant Medications    predniSONE (Deltasone) 10 mg tablet    Other Relevant Orders    CBC and Auto Differential    Comprehensive Metabolic Panel    Thyroid Stimulating Hormone    Thyroxine, Free    Lipid Panel    Vitamin D 25-Hydroxy,Total (for eval of Vitamin D levels)    Sedimentation Rate    XR chest 2 views    ZEKE with Reflex to SHAGUFTA    Vitamin B12      Mild episode of recurrent major depressive disorder     F33.0    Relevant Medications    predniSONE (Deltasone) 10 mg tablet    Other Relevant Orders    CBC and Auto Differential    Comprehensive Metabolic Panel    Thyroid Stimulating Hormone    Thyroxine, Free    Lipid Panel    Vitamin D 25-Hydroxy,Total (for eval of Vitamin D levels)    Sedimentation Rate    XR chest 2 views    ZEKE with Reflex to SHAGUFTA    Vitamin B12      Myalgia     M79.10    Relevant Medications    predniSONE (Deltasone) 10 mg tablet    Other Relevant Orders    CBC and Auto Differential    Comprehensive Metabolic Panel    Thyroid Stimulating Hormone    Thyroxine, Free    Lipid Panel    Vitamin D 25-Hydroxy,Total (for eval of Vitamin D levels)    Sedimentation Rate    XR chest 2 views    ZEKE with Reflex to SHAGUFTA    Vitamin B12    CK      Glossitis     K14.0    Relevant Medications    predniSONE (Deltasone) 10 mg tablet    Other Relevant Orders    CBC and Auto Differential    Comprehensive Metabolic Panel    Thyroid Stimulating Hormone    Thyroxine, Free    Lipid Panel    Vitamin D 25-Hydroxy,Total (for eval of Vitamin D levels)    Sedimentation Rate    XR chest 2 views    ZEKE with Reflex to SHAGUFTA    Vitamin B12      Anxiety and depression     F41.9, F32.A    Relevant Medications    desvenlafaxine succinate (Pristiq) 25 mg 24 hour  tablet      Combined hyperlipidemia     E78.2    Relevant Medications    desvenlafaxine succinate (Pristiq) 25 mg 24 hour tablet      Snoring     R06.83    Relevant Orders    Home sleep apnea test (HSAT)      Fatigue, unspecified type     R53.83    Relevant Orders    Home sleep apnea test (HSAT)

## 2025-05-30 LAB — CK SERPL-CCNC: 93 U/L (ref 21–240)

## 2025-05-31 LAB
25(OH)D3+25(OH)D2 SERPL-MCNC: 68 NG/ML (ref 30–100)
ALBUMIN SERPL-MCNC: 4.2 G/DL (ref 3.6–5.1)
ALP SERPL-CCNC: 92 U/L (ref 37–153)
ALT SERPL-CCNC: 71 U/L (ref 6–29)
ANA SER QL IF: NORMAL
ANION GAP SERPL CALCULATED.4IONS-SCNC: 10 MMOL/L (CALC) (ref 7–17)
AST SERPL-CCNC: 64 U/L (ref 10–35)
BASOPHILS # BLD AUTO: 52 CELLS/UL (ref 0–200)
BASOPHILS NFR BLD AUTO: 1 %
BILIRUB SERPL-MCNC: 0.5 MG/DL (ref 0.2–1.2)
BUN SERPL-MCNC: 14 MG/DL (ref 7–25)
CALCIUM SERPL-MCNC: 9.7 MG/DL (ref 8.6–10.4)
CHLORIDE SERPL-SCNC: 100 MMOL/L (ref 98–110)
CHOLEST SERPL-MCNC: 238 MG/DL
CHOLEST/HDLC SERPL: 5 (CALC)
CO2 SERPL-SCNC: 28 MMOL/L (ref 20–32)
CREAT SERPL-MCNC: 0.71 MG/DL (ref 0.5–1.03)
EGFRCR SERPLBLD CKD-EPI 2021: 101 ML/MIN/1.73M2
EOSINOPHIL # BLD AUTO: 270 CELLS/UL (ref 15–500)
EOSINOPHIL NFR BLD AUTO: 5.2 %
ERYTHROCYTE [DISTWIDTH] IN BLOOD BY AUTOMATED COUNT: 13.7 % (ref 11–15)
ERYTHROCYTE [SEDIMENTATION RATE] IN BLOOD BY WESTERGREN METHOD: 38 MM/H
GLUCOSE SERPL-MCNC: 108 MG/DL (ref 65–99)
HCT VFR BLD AUTO: 47.2 % (ref 35–45)
HDLC SERPL-MCNC: 48 MG/DL
HGB BLD-MCNC: 15 G/DL (ref 11.7–15.5)
LDLC SERPL CALC-MCNC: 159 MG/DL (CALC)
LYMPHOCYTES # BLD AUTO: 1550 CELLS/UL (ref 850–3900)
LYMPHOCYTES NFR BLD AUTO: 29.8 %
MCH RBC QN AUTO: 28 PG (ref 27–33)
MCHC RBC AUTO-ENTMCNC: 31.8 G/DL (ref 32–36)
MCV RBC AUTO: 88.1 FL (ref 80–100)
MONOCYTES # BLD AUTO: 437 CELLS/UL (ref 200–950)
MONOCYTES NFR BLD AUTO: 8.4 %
NEUTROPHILS # BLD AUTO: 2891 CELLS/UL (ref 1500–7800)
NEUTROPHILS NFR BLD AUTO: 55.6 %
NONHDLC SERPL-MCNC: 190 MG/DL (CALC)
PLATELET # BLD AUTO: 316 THOUSAND/UL (ref 140–400)
PMV BLD REES-ECKER: 11.6 FL (ref 7.5–12.5)
POTASSIUM SERPL-SCNC: 4.3 MMOL/L (ref 3.5–5.3)
PROT SERPL-MCNC: 7.3 G/DL (ref 6.1–8.1)
RBC # BLD AUTO: 5.36 MILLION/UL (ref 3.8–5.1)
SODIUM SERPL-SCNC: 138 MMOL/L (ref 135–146)
T4 FREE SERPL-MCNC: 1.1 NG/DL (ref 0.8–1.8)
TRIGL SERPL-MCNC: 162 MG/DL
TSH SERPL-ACNC: 2.55 MIU/L
VIT B12 SERPL-MCNC: 431 PG/ML (ref 200–1100)
WBC # BLD AUTO: 5.2 THOUSAND/UL (ref 3.8–10.8)

## 2025-06-03 LAB
25(OH)D3+25(OH)D2 SERPL-MCNC: 68 NG/ML (ref 30–100)
ALBUMIN SERPL-MCNC: 4.2 G/DL (ref 3.6–5.1)
ALP SERPL-CCNC: 92 U/L (ref 37–153)
ALT SERPL-CCNC: 71 U/L (ref 6–29)
ANA PAT SER IF-IMP: ABNORMAL
ANA PAT SER IF-IMP: ABNORMAL
ANA SER QL IF: POSITIVE
ANA TITR SER IF: ABNORMAL TITER
ANA TITR SER IF: ABNORMAL TITER
ANION GAP SERPL CALCULATED.4IONS-SCNC: 10 MMOL/L (CALC) (ref 7–17)
AST SERPL-CCNC: 64 U/L (ref 10–35)
BASOPHILS # BLD AUTO: 52 CELLS/UL (ref 0–200)
BASOPHILS NFR BLD AUTO: 1 %
BILIRUB SERPL-MCNC: 0.5 MG/DL (ref 0.2–1.2)
BUN SERPL-MCNC: 14 MG/DL (ref 7–25)
CALCIUM SERPL-MCNC: 9.7 MG/DL (ref 8.6–10.4)
CENTROMERE B AB SER-ACNC: ABNORMAL AI
CHLORIDE SERPL-SCNC: 100 MMOL/L (ref 98–110)
CHOLEST SERPL-MCNC: 238 MG/DL
CHOLEST/HDLC SERPL: 5 (CALC)
CO2 SERPL-SCNC: 28 MMOL/L (ref 20–32)
CREAT SERPL-MCNC: 0.71 MG/DL (ref 0.5–1.03)
DSDNA AB SER-ACNC: <1 IU/ML
EGFRCR SERPLBLD CKD-EPI 2021: 101 ML/MIN/1.73M2
ENA JO1 AB SER IA-ACNC: ABNORMAL AI
ENA RNP AB SER-ACNC: ABNORMAL AI
ENA SCL70 AB SER IA-ACNC: ABNORMAL AI
ENA SM AB SER IA-ACNC: ABNORMAL AI
ENA SM+RNP AB SER IA-ACNC: ABNORMAL AI
ENA SS-A AB SER IA-ACNC: ABNORMAL AI
ENA SS-B AB SER IA-ACNC: ABNORMAL AI
EOSINOPHIL # BLD AUTO: 270 CELLS/UL (ref 15–500)
EOSINOPHIL NFR BLD AUTO: 5.2 %
ERYTHROCYTE [DISTWIDTH] IN BLOOD BY AUTOMATED COUNT: 13.7 % (ref 11–15)
ERYTHROCYTE [SEDIMENTATION RATE] IN BLOOD BY WESTERGREN METHOD: 38 MM/H
GLUCOSE SERPL-MCNC: 108 MG/DL (ref 65–99)
HCT VFR BLD AUTO: 47.2 % (ref 35–45)
HDLC SERPL-MCNC: 48 MG/DL
HGB BLD-MCNC: 15 G/DL (ref 11.7–15.5)
LABORATORY COMMENT REPORT: ABNORMAL
LDLC SERPL CALC-MCNC: 159 MG/DL (CALC)
LYMPHOCYTES # BLD AUTO: 1550 CELLS/UL (ref 850–3900)
LYMPHOCYTES NFR BLD AUTO: 29.8 %
MCH RBC QN AUTO: 28 PG (ref 27–33)
MCHC RBC AUTO-ENTMCNC: 31.8 G/DL (ref 32–36)
MCV RBC AUTO: 88.1 FL (ref 80–100)
MONOCYTES # BLD AUTO: 437 CELLS/UL (ref 200–950)
MONOCYTES NFR BLD AUTO: 8.4 %
NEUTROPHILS # BLD AUTO: 2891 CELLS/UL (ref 1500–7800)
NEUTROPHILS NFR BLD AUTO: 55.6 %
NONHDLC SERPL-MCNC: 190 MG/DL (CALC)
NUCLEOSOME AB SER IA-ACNC: ABNORMAL AI
PLATELET # BLD AUTO: 316 THOUSAND/UL (ref 140–400)
PMV BLD REES-ECKER: 11.6 FL (ref 7.5–12.5)
POTASSIUM SERPL-SCNC: 4.3 MMOL/L (ref 3.5–5.3)
PROT SERPL-MCNC: 7.3 G/DL (ref 6.1–8.1)
RBC # BLD AUTO: 5.36 MILLION/UL (ref 3.8–5.1)
RIBOSOMAL P AB SER-ACNC: ABNORMAL AI
SODIUM SERPL-SCNC: 138 MMOL/L (ref 135–146)
T4 FREE SERPL-MCNC: 1.1 NG/DL (ref 0.8–1.8)
TRIGL SERPL-MCNC: 162 MG/DL
TSH SERPL-ACNC: 2.55 MIU/L
VIT B12 SERPL-MCNC: 431 PG/ML (ref 200–1100)
WBC # BLD AUTO: 5.2 THOUSAND/UL (ref 3.8–10.8)

## 2025-06-04 DIAGNOSIS — E78.2 MIXED HYPERLIPIDEMIA: ICD-10-CM

## 2025-06-04 DIAGNOSIS — E66.01 MORBID OBESITY (MULTI): Primary | ICD-10-CM

## 2025-06-04 DIAGNOSIS — I10 BENIGN HYPERTENSION: ICD-10-CM

## 2025-06-04 RX ORDER — TIRZEPATIDE 2.5 MG/.5ML
2.5 INJECTION, SOLUTION SUBCUTANEOUS
Qty: 2 ML | Refills: 2 | Status: SHIPPED | OUTPATIENT
Start: 2025-06-04 | End: 2025-06-05 | Stop reason: SDUPTHER

## 2025-06-05 DIAGNOSIS — I10 BENIGN HYPERTENSION: ICD-10-CM

## 2025-06-05 DIAGNOSIS — E78.2 MIXED HYPERLIPIDEMIA: ICD-10-CM

## 2025-06-05 DIAGNOSIS — E66.01 MORBID OBESITY (MULTI): ICD-10-CM

## 2025-06-05 RX ORDER — TIRZEPATIDE 2.5 MG/.5ML
2.5 INJECTION, SOLUTION SUBCUTANEOUS
Qty: 2 ML | Refills: 2 | Status: SHIPPED | OUTPATIENT
Start: 2025-06-05

## 2025-07-10 ENCOUNTER — CLINICAL SUPPORT (OUTPATIENT)
Dept: SLEEP MEDICINE | Facility: HOSPITAL | Age: 55
End: 2025-07-10
Payer: COMMERCIAL

## 2025-07-10 DIAGNOSIS — R06.83 SNORING: ICD-10-CM

## 2025-07-10 DIAGNOSIS — R53.83 FATIGUE, UNSPECIFIED TYPE: ICD-10-CM

## 2025-07-10 PROCEDURE — 95806 SLEEP STUDY UNATT&RESP EFFT: CPT | Performed by: INTERNAL MEDICINE

## 2025-07-13 DIAGNOSIS — K58.9 IRRITABLE BOWEL SYNDROME, UNSPECIFIED TYPE: ICD-10-CM

## 2025-07-13 DIAGNOSIS — R19.4 CHANGE IN BOWEL HABITS: ICD-10-CM

## 2025-07-13 DIAGNOSIS — R14.0 BLOATING: ICD-10-CM

## 2025-07-14 RX ORDER — AMITRIPTYLINE HYDROCHLORIDE 10 MG/1
TABLET, FILM COATED ORAL
Qty: 90 TABLET | Refills: 0 | Status: SHIPPED | OUTPATIENT
Start: 2025-07-14

## 2025-07-17 ENCOUNTER — TELEPHONE (OUTPATIENT)
Dept: PRIMARY CARE | Facility: CLINIC | Age: 55
End: 2025-07-17
Payer: COMMERCIAL

## 2025-07-17 DIAGNOSIS — F32.A ANXIETY AND DEPRESSION: ICD-10-CM

## 2025-07-17 DIAGNOSIS — I10 BENIGN HYPERTENSION: ICD-10-CM

## 2025-07-17 DIAGNOSIS — E78.2 COMBINED HYPERLIPIDEMIA: ICD-10-CM

## 2025-07-17 DIAGNOSIS — K21.9 GASTROESOPHAGEAL REFLUX DISEASE WITHOUT ESOPHAGITIS: ICD-10-CM

## 2025-07-17 DIAGNOSIS — F41.9 ANXIETY AND DEPRESSION: ICD-10-CM

## 2025-07-17 NOTE — TELEPHONE ENCOUNTER
Message Subject: desvenlafaxine     Stephanie Lira [address] [place] [address] Clinical Support Staff  Phone Number: [phone]            desvenlafaxine  (Newest Message First)             Stephanie OLVE [address] [place] [address] Clinical Support Staff (supporting Shadia Garcia MD) (Selected Message)        7/17/25  8:46 AM  This order was canceled by [facility] ([facility] [employer] because it needs a PA approval.  I got it last month through [employer] but it was very expensive and I only have about a week's worth.  Can you call [employer] and authorize this medication so I can get a refill?  Thank you!      Done

## 2025-07-18 RX ORDER — VENLAFAXINE 25 MG/1
25 TABLET ORAL DAILY
Qty: 90 TABLET | Refills: 1 | Status: SHIPPED | OUTPATIENT
Start: 2025-07-18 | End: 2025-10-16

## 2025-07-18 RX ORDER — DESVENLAFAXINE SUCCINATE 25 MG/1
25 TABLET, EXTENDED RELEASE ORAL DAILY
Qty: 90 TABLET | Refills: 0 | Status: CANCELLED | OUTPATIENT
Start: 2025-07-18

## 2025-07-18 RX ORDER — OMEPRAZOLE 40 MG/1
40 CAPSULE, DELAYED RELEASE ORAL DAILY
Qty: 90 CAPSULE | Refills: 1 | Status: SHIPPED | OUTPATIENT
Start: 2025-07-18

## 2025-07-18 NOTE — TELEPHONE ENCOUNTER
Last OV 5/29/25, no appt scheduled   Received handoff from previous nurse. patient comfortable- care continues.

## 2025-08-08 ENCOUNTER — APPOINTMENT (OUTPATIENT)
Dept: RADIOLOGY | Facility: HOSPITAL | Age: 55
End: 2025-08-08
Payer: COMMERCIAL

## 2025-08-12 ENCOUNTER — APPOINTMENT (OUTPATIENT)
Dept: RADIOLOGY | Facility: HOSPITAL | Age: 55
End: 2025-08-12
Payer: COMMERCIAL

## 2025-08-13 ENCOUNTER — APPOINTMENT (OUTPATIENT)
Dept: GASTROENTEROLOGY | Facility: CLINIC | Age: 55
End: 2025-08-13
Payer: COMMERCIAL

## 2025-08-13 VITALS — HEART RATE: 89 BPM | OXYGEN SATURATION: 98 % | WEIGHT: 270 LBS | HEIGHT: 65 IN | BODY MASS INDEX: 44.98 KG/M2

## 2025-08-13 DIAGNOSIS — K58.9 IRRITABLE BOWEL SYNDROME, UNSPECIFIED TYPE: ICD-10-CM

## 2025-08-13 DIAGNOSIS — R14.0 BLOATING: ICD-10-CM

## 2025-08-13 DIAGNOSIS — R19.4 CHANGE IN BOWEL HABITS: ICD-10-CM

## 2025-08-13 PROCEDURE — 99213 OFFICE O/P EST LOW 20 MIN: CPT | Performed by: INTERNAL MEDICINE

## 2025-08-13 PROCEDURE — 3008F BODY MASS INDEX DOCD: CPT | Performed by: INTERNAL MEDICINE

## 2025-08-13 RX ORDER — AMITRIPTYLINE HYDROCHLORIDE 10 MG/1
40 TABLET, FILM COATED ORAL NIGHTLY
Qty: 360 TABLET | Refills: 3 | Status: SHIPPED | OUTPATIENT
Start: 2025-08-13

## 2025-08-13 ASSESSMENT — ENCOUNTER SYMPTOMS: SHORTNESS OF BREATH: 0

## 2025-08-18 ENCOUNTER — APPOINTMENT (OUTPATIENT)
Dept: PRIMARY CARE | Facility: CLINIC | Age: 55
End: 2025-08-18
Payer: COMMERCIAL

## 2025-08-22 ENCOUNTER — HOSPITAL ENCOUNTER (OUTPATIENT)
Dept: RADIOLOGY | Facility: HOSPITAL | Age: 55
Discharge: HOME | End: 2025-08-22
Payer: COMMERCIAL

## 2025-08-22 ENCOUNTER — PATIENT MESSAGE (OUTPATIENT)
Dept: OBSTETRICS AND GYNECOLOGY | Facility: CLINIC | Age: 55
End: 2025-08-22
Payer: COMMERCIAL

## 2025-08-22 VITALS — WEIGHT: 270 LBS | BODY MASS INDEX: 44.93 KG/M2

## 2025-08-22 DIAGNOSIS — Z12.31 ENCOUNTER FOR SCREENING MAMMOGRAM FOR MALIGNANT NEOPLASM OF BREAST: ICD-10-CM

## 2025-08-22 PROCEDURE — 77067 SCR MAMMO BI INCL CAD: CPT

## 2025-08-22 PROCEDURE — 77067 SCR MAMMO BI INCL CAD: CPT | Performed by: STUDENT IN AN ORGANIZED HEALTH CARE EDUCATION/TRAINING PROGRAM

## 2025-08-22 PROCEDURE — 77063 BREAST TOMOSYNTHESIS BI: CPT | Performed by: STUDENT IN AN ORGANIZED HEALTH CARE EDUCATION/TRAINING PROGRAM

## 2025-09-15 ENCOUNTER — APPOINTMENT (OUTPATIENT)
Dept: PRIMARY CARE | Facility: CLINIC | Age: 55
End: 2025-09-15
Payer: COMMERCIAL

## 2025-12-29 ENCOUNTER — APPOINTMENT (OUTPATIENT)
Dept: RHEUMATOLOGY | Facility: CLINIC | Age: 55
End: 2025-12-29
Payer: COMMERCIAL